# Patient Record
Sex: MALE | Race: BLACK OR AFRICAN AMERICAN | NOT HISPANIC OR LATINO | Employment: OTHER | URBAN - METROPOLITAN AREA
[De-identification: names, ages, dates, MRNs, and addresses within clinical notes are randomized per-mention and may not be internally consistent; named-entity substitution may affect disease eponyms.]

---

## 2024-03-20 ENCOUNTER — HOSPITAL ENCOUNTER (EMERGENCY)
Facility: HOSPITAL | Age: 39
Discharge: HOME | End: 2024-03-20
Payer: COMMERCIAL

## 2024-03-20 VITALS
HEART RATE: 66 BPM | BODY MASS INDEX: 33.59 KG/M2 | WEIGHT: 214 LBS | HEIGHT: 67 IN | OXYGEN SATURATION: 100 % | RESPIRATION RATE: 12 BRPM | TEMPERATURE: 97.7 F | SYSTOLIC BLOOD PRESSURE: 167 MMHG | DIASTOLIC BLOOD PRESSURE: 98 MMHG

## 2024-03-20 DIAGNOSIS — L73.9 FOLLICULITIS: Primary | ICD-10-CM

## 2024-03-20 PROCEDURE — 99283 EMERGENCY DEPT VISIT LOW MDM: CPT

## 2024-03-20 RX ORDER — DOXYCYCLINE 100 MG/1
100 CAPSULE ORAL 2 TIMES DAILY
Qty: 14 CAPSULE | Refills: 0 | Status: SHIPPED | OUTPATIENT
Start: 2024-03-20 | End: 2024-03-27

## 2024-03-20 ASSESSMENT — COLUMBIA-SUICIDE SEVERITY RATING SCALE - C-SSRS
6. HAVE YOU EVER DONE ANYTHING, STARTED TO DO ANYTHING, OR PREPARED TO DO ANYTHING TO END YOUR LIFE?: NO
2. HAVE YOU ACTUALLY HAD ANY THOUGHTS OF KILLING YOURSELF?: NO
1. IN THE PAST MONTH, HAVE YOU WISHED YOU WERE DEAD OR WISHED YOU COULD GO TO SLEEP AND NOT WAKE UP?: NO

## 2024-03-20 ASSESSMENT — LIFESTYLE VARIABLES
EVER HAD A DRINK FIRST THING IN THE MORNING TO STEADY YOUR NERVES TO GET RID OF A HANGOVER: NO
EVER FELT BAD OR GUILTY ABOUT YOUR DRINKING: NO
HAVE PEOPLE ANNOYED YOU BY CRITICIZING YOUR DRINKING: NO
HAVE YOU EVER FELT YOU SHOULD CUT DOWN ON YOUR DRINKING: NO

## 2024-03-20 NOTE — Clinical Note
Vladislav Suresh was seen and treated in our emergency department on 3/20/2024.  He may return to work on 03/21/2024.       If you have any questions or concerns, please don't hesitate to call.      Ellie Amaro PA-C

## 2024-03-20 NOTE — ED TRIAGE NOTES
"PT has a small looking cyst on L-Upper back. PT thinks he was bit by something earlier in the week. Noticed this yesterday when going to scratch his back. Feels \"sore\".   "

## 2024-03-20 NOTE — ED PROVIDER NOTES
HPI   Chief Complaint   Patient presents with    Cyst       Is a 38-year-old otherwise healthy male presenting for evaluation of cyst on his upper left back region.  He states he noticed it about 2 days ago and it was itchy and he scratched it and it became sore yesterday.  He denies any recent fevers, other rashes, immunocompromise state, flulike symptoms, burning or tingling in the area.                        Kumar Coma Scale Score: 15                     Patient History   No past medical history on file.  No past surgical history on file.  No family history on file.  Social History     Tobacco Use    Smoking status: Not on file    Smokeless tobacco: Not on file   Substance Use Topics    Alcohol use: Not on file    Drug use: Not on file       Physical Exam   ED Triage Vitals [03/20/24 1153]   Temperature Heart Rate Respirations BP   36.5 °C (97.7 °F) 66 12 (!) 167/98      Pulse Ox Temp src Heart Rate Source Patient Position   99 % -- -- --      BP Location FiO2 (%)     -- --       Physical Exam  Vitals and nursing note reviewed.   Constitutional:       General: He is not in acute distress.     Appearance: He is well-developed.   HENT:      Head: Normocephalic and atraumatic.   Eyes:      Conjunctiva/sclera: Conjunctivae normal.   Cardiovascular:      Rate and Rhythm: Normal rate and regular rhythm.      Heart sounds: No murmur heard.  Pulmonary:      Effort: Pulmonary effort is normal. No respiratory distress.      Breath sounds: Normal breath sounds.   Abdominal:      Palpations: Abdomen is soft.      Tenderness: There is no abdominal tenderness.   Musculoskeletal:         General: No swelling.      Cervical back: Neck supple.   Skin:     General: Skin is warm and dry.      Capillary Refill: Capillary refill takes less than 2 seconds.      Comments: Small 2 cm right papule to the left upper back with surrounding erythema.   Neurological:      Mental Status: He is alert.   Psychiatric:         Mood and Affect:  Mood normal.         ED Course & MDM   Diagnoses as of 03/21/24 1039   Folliculitis       Medical Decision Making  Parts of this chart have been completed using voice recognition software. Please excuse any errors of transcription.  My thought process and reason for plan has been formulated from the time that I saw the patient until the time of disposition and is not specific to one specific moment during their visit and furthermore my MDM encompasses this entire chart and not only this text box.      HPI: Detailed above.    Exam: A medically appropriate exam performed, outlined above, given the known history and presentation.    History obtained from: Patient    Medications given during visit:  Medications - No data to display     Diagnostic/tests  Labs Reviewed - No data to display   No orders to display        Considerations/further MDM:  Well-appearing 38-year-old male presenting for evaluation of cyst on his left upper back.  On exam, there are white papules with surrounding erythema that appears to be infected ingrown hairs.  I personally lanced the papules with a needle and cleaned the area with chlorhexidine.  Patient was given a 7-day prescription for doxycycline for coverage of infection.  He was instructed to follow-up with primary care regarding his ER visit.  Discharged home in stable condition.      Procedure  Procedures     Ellie Amaro PA-C  03/21/24 1042

## 2024-03-20 NOTE — DISCHARGE INSTRUCTIONS
Please take doxycycline twice a day for 7 days for treatment of your infected ingrown hair.  Keep the area clean and dry.  Follow-up with primary care within 72 hours regarding your ER visit.  Present back to ER if any worsening of symptoms.

## 2024-04-29 ENCOUNTER — HOSPITAL ENCOUNTER (EMERGENCY)
Facility: HOSPITAL | Age: 39
Discharge: HOME | End: 2024-04-29
Payer: COMMERCIAL

## 2024-04-29 VITALS
RESPIRATION RATE: 16 BRPM | DIASTOLIC BLOOD PRESSURE: 74 MMHG | HEART RATE: 81 BPM | WEIGHT: 212 LBS | SYSTOLIC BLOOD PRESSURE: 128 MMHG | HEIGHT: 67 IN | OXYGEN SATURATION: 100 % | BODY MASS INDEX: 33.27 KG/M2 | TEMPERATURE: 98.2 F

## 2024-04-29 DIAGNOSIS — J01.10 ACUTE FRONTAL SINUSITIS, RECURRENCE NOT SPECIFIED: ICD-10-CM

## 2024-04-29 DIAGNOSIS — R09.81 NASAL CONGESTION: Primary | ICD-10-CM

## 2024-04-29 DIAGNOSIS — T78.40XA ALLERGY, INITIAL ENCOUNTER: ICD-10-CM

## 2024-04-29 PROCEDURE — 96372 THER/PROPH/DIAG INJ SC/IM: CPT | Performed by: PHYSICIAN ASSISTANT

## 2024-04-29 PROCEDURE — 99283 EMERGENCY DEPT VISIT LOW MDM: CPT

## 2024-04-29 PROCEDURE — 2500000001 HC RX 250 WO HCPCS SELF ADMINISTERED DRUGS (ALT 637 FOR MEDICARE OP): Performed by: PHYSICIAN ASSISTANT

## 2024-04-29 PROCEDURE — 2500000004 HC RX 250 GENERAL PHARMACY W/ HCPCS (ALT 636 FOR OP/ED): Performed by: PHYSICIAN ASSISTANT

## 2024-04-29 RX ORDER — KETOROLAC TROMETHAMINE 30 MG/ML
30 INJECTION, SOLUTION INTRAMUSCULAR; INTRAVENOUS ONCE
Status: COMPLETED | OUTPATIENT
Start: 2024-04-29 | End: 2024-04-29

## 2024-04-29 RX ORDER — METHYLPREDNISOLONE 4 MG/1
TABLET ORAL
Qty: 21 TABLET | Refills: 0 | Status: SHIPPED | OUTPATIENT
Start: 2024-04-29 | End: 2024-05-06

## 2024-04-29 RX ORDER — PREDNISONE 20 MG/1
60 TABLET ORAL ONCE
Status: COMPLETED | OUTPATIENT
Start: 2024-04-29 | End: 2024-04-29

## 2024-04-29 RX ORDER — CETIRIZINE HYDROCHLORIDE 10 MG/1
10 TABLET ORAL DAILY
Qty: 10 TABLET | Refills: 0 | Status: SHIPPED | OUTPATIENT
Start: 2024-04-29 | End: 2024-05-09

## 2024-04-29 RX ORDER — DOXYCYCLINE 100 MG/1
100 CAPSULE ORAL 2 TIMES DAILY
Qty: 20 CAPSULE | Refills: 0 | Status: SHIPPED | OUTPATIENT
Start: 2024-04-29 | End: 2024-05-09

## 2024-04-29 RX ORDER — DIPHENHYDRAMINE HCL 25 MG
25 TABLET ORAL ONCE
Status: COMPLETED | OUTPATIENT
Start: 2024-04-29 | End: 2024-04-29

## 2024-04-29 RX ADMIN — DIPHENHYDRAMINE HYDROCHLORIDE 25 MG: 25 TABLET ORAL at 13:36

## 2024-04-29 RX ADMIN — PREDNISONE 60 MG: 20 TABLET ORAL at 13:36

## 2024-04-29 RX ADMIN — KETOROLAC TROMETHAMINE 30 MG: 30 INJECTION, SOLUTION INTRAMUSCULAR at 13:36

## 2024-04-29 ASSESSMENT — COLUMBIA-SUICIDE SEVERITY RATING SCALE - C-SSRS
1. IN THE PAST MONTH, HAVE YOU WISHED YOU WERE DEAD OR WISHED YOU COULD GO TO SLEEP AND NOT WAKE UP?: NO
2. HAVE YOU ACTUALLY HAD ANY THOUGHTS OF KILLING YOURSELF?: NO
6. HAVE YOU EVER DONE ANYTHING, STARTED TO DO ANYTHING, OR PREPARED TO DO ANYTHING TO END YOUR LIFE?: NO

## 2024-04-29 ASSESSMENT — PAIN - FUNCTIONAL ASSESSMENT: PAIN_FUNCTIONAL_ASSESSMENT: 0-10

## 2024-04-29 NOTE — ED PROVIDER NOTES
HPI   Chief Complaint   Patient presents with    Nasal Congestion     Severe allergies. Congestion for two days. Headache.       38-year-old male presented emergency department the chief complaint of nasal congestion, allergies.  CAT scan is 2 days ago.  Has had cough congestion since then.  Been taking Claritin with minimal relief.  Denies lightheadedness dizziness numbness weakness.  Well-appearing nontoxic afebrile.  No other complaint.                          Kumar Coma Scale Score: 15                     Patient History   No past medical history on file.  No past surgical history on file.  No family history on file.  Social History     Tobacco Use    Smoking status: Not on file    Smokeless tobacco: Not on file   Substance Use Topics    Alcohol use: Not on file    Drug use: Not on file       Physical Exam   ED Triage Vitals [04/29/24 1315]   Temperature Heart Rate Respirations BP   36.8 °C (98.2 °F) 86 18 (!) 139/97      Pulse Ox Temp Source Heart Rate Source Patient Position   100 % Tympanic -- Sitting      BP Location FiO2 (%)     Left arm --       Physical Exam  Vitals and nursing note reviewed.   Constitutional:       Appearance: Normal appearance.   HENT:      Head: Normocephalic.      Nose: Nose normal.      Mouth/Throat:      Mouth: Mucous membranes are moist.   Cardiovascular:      Rate and Rhythm: Normal rate.   Pulmonary:      Effort: Pulmonary effort is normal.   Musculoskeletal:         General: Normal range of motion.      Cervical back: Normal range of motion.   Skin:     General: Skin is warm.   Neurological:      General: No focal deficit present.      Mental Status: He is alert and oriented to person, place, and time.   Psychiatric:         Mood and Affect: Mood normal.         ED Course & MDM   Diagnoses as of 04/29/24 1335   Nasal congestion   Acute frontal sinusitis, recurrence not specified   Allergy, initial encounter       Medical Decision Making  I have seen and evaluated this  patient.  Physician available for consultation.  Vital signs have been reviewed.  All laboratory and diagnostic imaging is reviewed by myself and interpreted by myself unless otherwise stated.  Additionally imaging is interpreted by radiologist.    Most consistent with nasal congestion likely from allergies, could be viral or bacterial component as well.  Treated with steroid, watch and wait antibiotic.  Released in good condition with outpatient follow-up.    Labs Reviewed - No data to display  No orders to display     Medications   ketorolac (Toradol) injection 30 mg (has no administration in time range)   diphenhydrAMINE (Sominex) tablet 25 mg (has no administration in time range)   predniSONE (Deltasone) tablet 60 mg (has no administration in time range)     New Prescriptions    CETIRIZINE (ZYRTEC) 10 MG TABLET    Take 1 tablet (10 mg) by mouth once daily for 10 days.    DOXYCYCLINE (VIBRAMYCIN) 100 MG CAPSULE    Take 1 capsule (100 mg) by mouth 2 times a day for 10 days. Take with at least 8 ounces (large glass) of water, do not lie down for 30 minutes after    METHYLPREDNISOLONE (MEDROL DOSPAK) 4 MG TABLETS    Follow schedule on package instructions         Procedure  Procedures     Lucius Serna PA-C  04/29/24 3314

## 2024-04-29 NOTE — Clinical Note
Vladislav Suresh was seen and treated in our emergency department on 4/29/2024.  He may return to work on 04/30/2024.       If you have any questions or concerns, please don't hesitate to call.      Lucius Serna PA-C

## 2024-08-04 ENCOUNTER — APPOINTMENT (OUTPATIENT)
Dept: RADIOLOGY | Facility: HOSPITAL | Age: 39
End: 2024-08-04
Payer: COMMERCIAL

## 2024-08-04 ENCOUNTER — HOSPITAL ENCOUNTER (EMERGENCY)
Facility: HOSPITAL | Age: 39
Discharge: HOME | End: 2024-08-04
Attending: EMERGENCY MEDICINE
Payer: COMMERCIAL

## 2024-08-04 VITALS
SYSTOLIC BLOOD PRESSURE: 157 MMHG | RESPIRATION RATE: 18 BRPM | OXYGEN SATURATION: 98 % | WEIGHT: 220 LBS | HEIGHT: 67 IN | TEMPERATURE: 98.2 F | BODY MASS INDEX: 34.53 KG/M2 | HEART RATE: 55 BPM | DIASTOLIC BLOOD PRESSURE: 97 MMHG

## 2024-08-04 DIAGNOSIS — M54.6 ACUTE LEFT-SIDED THORACIC BACK PAIN: Primary | ICD-10-CM

## 2024-08-04 PROCEDURE — 96374 THER/PROPH/DIAG INJ IV PUSH: CPT

## 2024-08-04 PROCEDURE — 2500000004 HC RX 250 GENERAL PHARMACY W/ HCPCS (ALT 636 FOR OP/ED): Performed by: EMERGENCY MEDICINE

## 2024-08-04 PROCEDURE — 71101 X-RAY EXAM UNILAT RIBS/CHEST: CPT | Mod: LEFT SIDE | Performed by: RADIOLOGY

## 2024-08-04 PROCEDURE — 96372 THER/PROPH/DIAG INJ SC/IM: CPT | Performed by: EMERGENCY MEDICINE

## 2024-08-04 PROCEDURE — 71101 X-RAY EXAM UNILAT RIBS/CHEST: CPT | Mod: LT

## 2024-08-04 PROCEDURE — 99284 EMERGENCY DEPT VISIT MOD MDM: CPT | Mod: 25

## 2024-08-04 RX ORDER — CYCLOBENZAPRINE HCL 5 MG
5 TABLET ORAL 3 TIMES DAILY PRN
Qty: 30 TABLET | Refills: 0 | Status: SHIPPED | OUTPATIENT
Start: 2024-08-04 | End: 2024-08-14

## 2024-08-04 RX ORDER — HYDROCODONE BITARTRATE AND ACETAMINOPHEN 5; 325 MG/1; MG/1
1 TABLET ORAL EVERY 6 HOURS PRN
Qty: 12 TABLET | Refills: 0 | Status: SHIPPED | OUTPATIENT
Start: 2024-08-04 | End: 2024-08-07

## 2024-08-04 RX ORDER — MORPHINE SULFATE 4 MG/ML
4 INJECTION, SOLUTION INTRAMUSCULAR; INTRAVENOUS ONCE
Status: COMPLETED | OUTPATIENT
Start: 2024-08-04 | End: 2024-08-04

## 2024-08-04 RX ORDER — HYDROMORPHONE HYDROCHLORIDE 1 MG/ML
1 INJECTION, SOLUTION INTRAMUSCULAR; INTRAVENOUS; SUBCUTANEOUS ONCE
Status: COMPLETED | OUTPATIENT
Start: 2024-08-04 | End: 2024-08-04

## 2024-08-04 RX ORDER — ORPHENADRINE CITRATE 30 MG/ML
60 INJECTION INTRAMUSCULAR; INTRAVENOUS ONCE
Status: COMPLETED | OUTPATIENT
Start: 2024-08-04 | End: 2024-08-04

## 2024-08-04 RX ORDER — KETOROLAC TROMETHAMINE 30 MG/ML
30 INJECTION, SOLUTION INTRAMUSCULAR; INTRAVENOUS ONCE
Status: COMPLETED | OUTPATIENT
Start: 2024-08-04 | End: 2024-08-04

## 2024-08-04 ASSESSMENT — PAIN DESCRIPTION - LOCATION
LOCATION: BACK
LOCATION: BACK

## 2024-08-04 ASSESSMENT — PAIN DESCRIPTION - FREQUENCY: FREQUENCY: CONSTANT/CONTINUOUS

## 2024-08-04 ASSESSMENT — PAIN SCALES - GENERAL
PAINLEVEL_OUTOF10: 10 - WORST POSSIBLE PAIN
PAINLEVEL_OUTOF10: 5 - MODERATE PAIN
PAINLEVEL_OUTOF10: 10 - WORST POSSIBLE PAIN
PAINLEVEL_OUTOF10: 7

## 2024-08-04 ASSESSMENT — PAIN DESCRIPTION - ORIENTATION
ORIENTATION: LEFT
ORIENTATION: LEFT

## 2024-08-04 ASSESSMENT — PAIN DESCRIPTION - DESCRIPTORS: DESCRIPTORS: SHARP

## 2024-08-04 ASSESSMENT — PAIN - FUNCTIONAL ASSESSMENT: PAIN_FUNCTIONAL_ASSESSMENT: 0-10

## 2024-08-04 NOTE — ED TRIAGE NOTES
While at home smoking patient started to cough, when he noticed he felt a pop on left side, says that the pain is 10 out 10 and the pain is only on the left lower part of his back, denies any falling or past injury.

## 2024-08-04 NOTE — ED PROVIDER NOTES
EMERGENCY DEPARTMENT ENCOUNTER      Pt Name: Vladislav Suresh  MRN: [id number]  Birthdate 1985  Date of evaluation: 8/4/2024  ED Provider: Anette Gaston DO     CHIEF COMPLAINT       Chief Complaint   Patient presents with    Back Pain     Lower left part of back        HISTORY OF PRESENT ILLNESS    Vladislav Suresh is a 38 y.o. who presents to the emergency department via private vehicle with complaint of back pain after coughing.  He is being seen for smoking marijuana yesterday and went into a coughing fit.  He felt something pop in his left back and has had severe pain since.  He took one 800 mg ibuprofen with minimal improvement.  He presents now for further evaluation.  The pain does not radiate down his legs.  He does have baseline neuropathy with foot drop to the right leg that is unchanged.  He states the pain is worse with deep breathing and movement.  No other acute complaints.    REVIEW OF SYSTEMS     Focused ROS performed and negative other than as listed in HPI    PAST MEDICAL HISTORY   No past medical history on file.    SURGICAL HISTORY     No past surgical history on file.    CURRENT MEDICATIONS       Discharge Medication List as of 8/4/2024 10:58 AM        CONTINUE these medications which have NOT CHANGED    Details   cetirizine (ZyrTEC) 10 mg tablet Take 1 tablet (10 mg) by mouth once daily for 10 days., Starting Mon 4/29/2024, Until Thu 5/9/2024, Normal             ALLERGIES     Shellfish containing products, Naproxen, and Penicillins    FAMILY HISTORY     No family history on file.     SOCIAL HISTORY       Social History     Socioeconomic History    Marital status: Single       PHYSICAL EXAM       ED Triage Vitals [08/04/24 0625]   Temperature Heart Rate Respirations BP   36.8 °C (98.2 °F) 67 16 (!) 162/104      Pulse Ox Temp src Heart Rate Source Patient Position   99 % -- -- --      BP Location FiO2 (%)     -- --        General: Appears well, no acute distress, alert  Head: Head  atraumatic; normocephalic  Eyes: normal inspection; no icterus  ENT: mucosa moist without lesion  Neck: Normal inspection, no meningeal signs  Resp: Normal breath sounds, no wheeze or crackles; No respiratory distress  Chest Wall: no tenderness or deformity  Heart: Heart rate and rhythm regular; No Murmurs  Abdomen: Soft, Non-tender; No distention, guarding, rigidity, or rebound  MSK: Normal appearance; Moves all extremities; No Pedal edema; no midline spinal tenderness, positive tenderness to the left back extending from the lower posterior rib cage to the mid quadratus lumborum, sensation intact dermatomes L2 through S1 bilaterally, strength intact to hip flexors/extensors, knee flexors/extensors, plantar/dorsiflexion bilaterally.   Neuro: Alert; no focal deficits, moves all extremities  Psych: Mood and Affect normal  Skin: Color appropriate; warm; Dry    DIAGNOSTIC RESULTS   Lab and radiology results are independently interpreted unless noted below.  RADIOLOGY (Per Emergency Physician):     Interpretation per the Radiologist below, if available at the time of this note:  XR ribs 2 views left w chest pa or ap   Final Result   Negative chest and left ribs.        MACRO:   None        Signed by: Meg Silva 8/4/2024 8:32 AM   Dictation workstation:   [id number]        EMERGENCY DEPARTMENT COURSE/MDM   Patient presents with musculoskeletal back pain after a coughing fit yesterday evening.  He is exquisitely tender to palpation however there is no radiculopathy.  No red flag symptoms or concern for acute neurologic findings on high-sensitivity neuroexam.  Will obtain x-ray to ensure that there is no acute fracture to the rib cage from coughing.  He is provided a muscle relaxer, Toradol (allergy to Naprosyn noted however he has tolerated ibuprofen), and morphine.  He is agreeable with this plan of care.    ED Course as of 08/04/24 1131   Sun Aug 04, 2024   0955 X-ray returned showing no acute abnormalities.  On  reexamination patient states that his pain is no better.  Will provide another dose of pain medicine. [EF]      ED Course User Index  [EF] Anette Gaston, DO         Diagnoses as of 08/04/24 1131   Acute left-sided thoracic back pain       Meds Administered:  Medications   ketorolac (Toradol) injection 30 mg (30 mg intramuscular Given 8/4/24 0735)   morphine injection 4 mg (4 mg intramuscular Given 8/4/24 0735)   orphenadrine (Norflex) injection 60 mg (60 mg intramuscular Given 8/4/24 0735)   HYDROmorphone (Dilaudid) injection 1 mg (1 mg intravenous Given 8/4/24 1015)       PROCEDURES   Unless otherwise noted below, none  Procedures      FINAL IMPRESSION      1. Acute left-sided thoracic back pain          DISPOSITION    Discharge 08/04/2024 10:55:45 AM    DISCHARGE   PATIENT REFERRED TO:  No follow-up provider specified.    DISCHARGE MEDICATIONS:  Discharge Medication List as of 8/4/2024 10:58 AM        START taking these medications    Details   cyclobenzaprine (Flexeril) 5 mg tablet Take 1 tablet (5 mg) by mouth 3 times a day as needed for muscle spasms for up to 10 days., Starting Sun 8/4/2024, Until Wed 8/14/2024 at 2359, Normal      HYDROcodone-acetaminophen (Norco) 5-325 mg tablet Take 1 tablet by mouth every 6 hours if needed for severe pain (7 - 10) for up to 3 days., Starting Sun 8/4/2024, Until Wed 8/7/2024 at 2359, Normal              The patient is discharged back to their place of residence.  Discharge diagnosis, instructions and plan were discussed and understood. At the time of discharge the patient was comfortable and was in no apparent distress. Patient is aware of diagnostic uncertainty and was notified though testing is negative here, there is a very small chance that pathology may be missed.  The patient understands these risks and the patient /family understood to return immediately to the emergency department if the symptoms worsen or if they have any additional concerns.      (Comment: Please  note this report has been produced using speech recognition software and may contain errors related to that system including errors in grammar, punctuation, and spelling, as well as words and phrases that may be inappropriate.  If there are any questions or concerns please feel free to contact the dictating provider for clarification.)    Anette Gaston DO (electronically signed)  Emergency Medicine Physician    History Limited by: None  Independent history obtained from: None  External records reviewed: None  Diagnostics interpreted by me: None  Discussions with other clinicians: None  Chronic conditions impacting care: None  Social determinants of health affecting care: None  Diagnostic tests considered but not performed: n/a  ED Medications managed:  Medications   ketorolac (Toradol) injection 30 mg (30 mg intramuscular Given 8/4/24 0735)   morphine injection 4 mg (4 mg intramuscular Given 8/4/24 0735)   orphenadrine (Norflex) injection 60 mg (60 mg intramuscular Given 8/4/24 0735)   HYDROmorphone (Dilaudid) injection 1 mg (1 mg intravenous Given 8/4/24 1015)       Prescription drugs considered: None             Anette Gaston DO  08/04/24 1137

## 2024-09-21 ENCOUNTER — APPOINTMENT (OUTPATIENT)
Dept: RADIOLOGY | Facility: HOSPITAL | Age: 39
End: 2024-09-21
Payer: COMMERCIAL

## 2024-09-21 ENCOUNTER — HOSPITAL ENCOUNTER (EMERGENCY)
Facility: HOSPITAL | Age: 39
Discharge: LEFT BEFORE DISCHARGE | End: 2024-09-21
Payer: COMMERCIAL

## 2024-09-21 ENCOUNTER — APPOINTMENT (OUTPATIENT)
Dept: CARDIOLOGY | Facility: HOSPITAL | Age: 39
End: 2024-09-21
Payer: COMMERCIAL

## 2024-09-21 VITALS
WEIGHT: 220 LBS | RESPIRATION RATE: 17 BRPM | TEMPERATURE: 98.1 F | HEIGHT: 67 IN | OXYGEN SATURATION: 100 % | DIASTOLIC BLOOD PRESSURE: 101 MMHG | BODY MASS INDEX: 34.53 KG/M2 | HEART RATE: 83 BPM | SYSTOLIC BLOOD PRESSURE: 155 MMHG

## 2024-09-21 DIAGNOSIS — R00.2 PALPITATIONS: Primary | ICD-10-CM

## 2024-09-21 LAB
ALBUMIN SERPL BCP-MCNC: 4.2 G/DL (ref 3.4–5)
ALP SERPL-CCNC: 75 U/L (ref 33–120)
ALT SERPL W P-5'-P-CCNC: 39 U/L (ref 10–52)
ANION GAP SERPL CALCULATED.3IONS-SCNC: 9 MMOL/L (ref 10–20)
AST SERPL W P-5'-P-CCNC: 21 U/L (ref 9–39)
BASOPHILS # BLD AUTO: 0.09 X10*3/UL (ref 0–0.1)
BASOPHILS NFR BLD AUTO: 0.8 %
BILIRUB SERPL-MCNC: 0.4 MG/DL (ref 0–1.2)
BUN SERPL-MCNC: 11 MG/DL (ref 6–23)
CALCIUM SERPL-MCNC: 9.3 MG/DL (ref 8.6–10.3)
CARDIAC TROPONIN I PNL SERPL HS: 8 NG/L (ref 0–20)
CARDIAC TROPONIN I PNL SERPL HS: 8 NG/L (ref 0–20)
CHLORIDE SERPL-SCNC: 105 MMOL/L (ref 98–107)
CO2 SERPL-SCNC: 28 MMOL/L (ref 21–32)
CREAT SERPL-MCNC: 0.96 MG/DL (ref 0.5–1.3)
EGFRCR SERPLBLD CKD-EPI 2021: >90 ML/MIN/1.73M*2
EOSINOPHIL # BLD AUTO: 0.38 X10*3/UL (ref 0–0.7)
EOSINOPHIL NFR BLD AUTO: 3.3 %
ERYTHROCYTE [DISTWIDTH] IN BLOOD BY AUTOMATED COUNT: 11.6 % (ref 11.5–14.5)
GLUCOSE SERPL-MCNC: 86 MG/DL (ref 74–99)
HCT VFR BLD AUTO: 43.2 % (ref 41–52)
HGB BLD-MCNC: 14.6 G/DL (ref 13.5–17.5)
IMM GRANULOCYTES # BLD AUTO: 0.06 X10*3/UL (ref 0–0.7)
IMM GRANULOCYTES NFR BLD AUTO: 0.5 % (ref 0–0.9)
LYMPHOCYTES # BLD AUTO: 3.15 X10*3/UL (ref 1.2–4.8)
LYMPHOCYTES NFR BLD AUTO: 27.4 %
MCH RBC QN AUTO: 32.7 PG (ref 26–34)
MCHC RBC AUTO-ENTMCNC: 33.8 G/DL (ref 32–36)
MCV RBC AUTO: 97 FL (ref 80–100)
MONOCYTES # BLD AUTO: 0.81 X10*3/UL (ref 0.1–1)
MONOCYTES NFR BLD AUTO: 7 %
NEUTROPHILS # BLD AUTO: 7 X10*3/UL (ref 1.2–7.7)
NEUTROPHILS NFR BLD AUTO: 61 %
NRBC BLD-RTO: 0 /100 WBCS (ref 0–0)
PLATELET # BLD AUTO: 369 X10*3/UL (ref 150–450)
POTASSIUM SERPL-SCNC: 4.1 MMOL/L (ref 3.5–5.3)
PROT SERPL-MCNC: 7.2 G/DL (ref 6.4–8.2)
RBC # BLD AUTO: 4.46 X10*6/UL (ref 4.5–5.9)
SODIUM SERPL-SCNC: 138 MMOL/L (ref 136–145)
WBC # BLD AUTO: 11.5 X10*3/UL (ref 4.4–11.3)

## 2024-09-21 PROCEDURE — 85025 COMPLETE CBC W/AUTO DIFF WBC: CPT | Performed by: NURSE PRACTITIONER

## 2024-09-21 PROCEDURE — 84484 ASSAY OF TROPONIN QUANT: CPT | Performed by: NURSE PRACTITIONER

## 2024-09-21 PROCEDURE — 71045 X-RAY EXAM CHEST 1 VIEW: CPT

## 2024-09-21 PROCEDURE — 80053 COMPREHEN METABOLIC PANEL: CPT | Performed by: NURSE PRACTITIONER

## 2024-09-21 PROCEDURE — 71045 X-RAY EXAM CHEST 1 VIEW: CPT | Performed by: RADIOLOGY

## 2024-09-21 PROCEDURE — 36415 COLL VENOUS BLD VENIPUNCTURE: CPT | Performed by: NURSE PRACTITIONER

## 2024-09-21 PROCEDURE — 2500000004 HC RX 250 GENERAL PHARMACY W/ HCPCS (ALT 636 FOR OP/ED): Performed by: NURSE PRACTITIONER

## 2024-09-21 PROCEDURE — 73080 X-RAY EXAM OF ELBOW: CPT | Mod: RT

## 2024-09-21 PROCEDURE — 93005 ELECTROCARDIOGRAM TRACING: CPT

## 2024-09-21 PROCEDURE — 99284 EMERGENCY DEPT VISIT MOD MDM: CPT | Mod: 25

## 2024-09-21 PROCEDURE — 96374 THER/PROPH/DIAG INJ IV PUSH: CPT

## 2024-09-21 PROCEDURE — 73080 X-RAY EXAM OF ELBOW: CPT | Mod: RIGHT SIDE | Performed by: RADIOLOGY

## 2024-09-21 RX ORDER — KETOROLAC TROMETHAMINE 30 MG/ML
15 INJECTION, SOLUTION INTRAMUSCULAR; INTRAVENOUS ONCE
Status: COMPLETED | OUTPATIENT
Start: 2024-09-21 | End: 2024-09-21

## 2024-09-21 RX ADMIN — KETOROLAC TROMETHAMINE 15 MG: 30 INJECTION, SOLUTION INTRAMUSCULAR at 20:27

## 2024-09-21 ASSESSMENT — LIFESTYLE VARIABLES
EVER HAD A DRINK FIRST THING IN THE MORNING TO STEADY YOUR NERVES TO GET RID OF A HANGOVER: NO
EVER FELT BAD OR GUILTY ABOUT YOUR DRINKING: NO
HAVE PEOPLE ANNOYED YOU BY CRITICIZING YOUR DRINKING: NO
HAVE YOU EVER FELT YOU SHOULD CUT DOWN ON YOUR DRINKING: NO
TOTAL SCORE: 0

## 2024-09-21 ASSESSMENT — PAIN - FUNCTIONAL ASSESSMENT
PAIN_FUNCTIONAL_ASSESSMENT: 0-10
PAIN_FUNCTIONAL_ASSESSMENT: 0-10

## 2024-09-21 ASSESSMENT — PAIN DESCRIPTION - PAIN TYPE: TYPE: ACUTE PAIN

## 2024-09-21 ASSESSMENT — PAIN SCALES - GENERAL
PAINLEVEL_OUTOF10: 4
PAINLEVEL_OUTOF10: 10 - WORST POSSIBLE PAIN

## 2024-09-21 ASSESSMENT — PAIN DESCRIPTION - PROGRESSION: CLINICAL_PROGRESSION: GRADUALLY IMPROVING

## 2024-09-21 NOTE — ED TRIAGE NOTES
"\"2 days ago feels like heart is racing. Feels like it is racing right now.\" Per pt.  Current heart rate 80s. Also c/o Rt elbow pain.   "

## 2024-09-21 NOTE — ED PROVIDER NOTES
HPI   Chief Complaint   Patient presents with    Rapid Heart Rate    Elbow Pain       HPI  See my MDM      Patient History   No past medical history on file.  No past surgical history on file.  No family history on file.  Social History     Tobacco Use    Smoking status: Not on file    Smokeless tobacco: Not on file   Substance Use Topics    Alcohol use: Not on file    Drug use: Not on file       Physical Exam   ED Triage Vitals [09/21/24 1805]   Temperature Heart Rate Respirations BP   36.7 °C (98.1 °F) 83 17 (!) 155/101      Pulse Ox Temp Source Heart Rate Source Patient Position   100 % Temporal Radial Sitting      BP Location FiO2 (%)     Left arm --       Physical Exam  CONSTITUTIONAL: Vital signs reviewed as charted, well-developed and in no distress  Eyes: Extraocular muscles are intact. Pupils equal round and reactive to light. Conjunctiva are pink.    ENT: Mucous membranes are moist. Tongue in the midline. Pharynx was without erythema or exudates, uvula midline  LUNGS: Breath sounds equal and clear to auscultation. Good air exchange, no wheezes rales or retractions, pulse oximetry is charted.  HEART: Regular rate and rhythm without murmur thrill or rub, strong tones, auscultation is normal.  ABDOMEN: Soft and nontender without guarding rebound rigidity or mass. Bowel sounds are present and normal in all quadrants. There is no palpable masses or aneurysms identified. No hepatosplenomegaly, normal abdominal exam.  Neuro: The patient is awake, alert and oriented ×3. Moving all 4 extremities and answering questions appropriately.   MUSCULOSKELETAL: Exam of the right elbow shows tenderness over the ulnar side.  There is no ecchymosis edema or deformity motor sensation pulses are intact.  PSYCH: Awake alert oriented, normal mood and affect.  Skin:  Dry, normal color, warm to the touch, no rash present.        ED Course & MDM   ED Course as of 09/21/24 4593   Sat Sep 21, 2024   2020 ECG 12 Lead  ECG performed on  2024 at  and interpreted by me at  showing a normal sinus rhythm with sinus arrhythmia, no axis deviation, intervals within normal limits, no STEMI.  No previous for comparison. [EG]      ED Course User Index  [EG] Ashtyn Hemphill MD         Diagnoses as of 249   Palpitations                 No data recorded     [place] Coma Scale Score: 15 (24 2030 : Snow Talavera RN)                           Medical Decision Making  History obtained from: patient    Vital signs, nursing notes, current medications, past medical history, Surgical history, allergies, social history, family History were reviewed.         HPI:  Patient 38-year-old gentleman who is an active smoker presenting to ED today stating the last 2 days since his mom  he has been having a feeling of palpitations and chest discomfort.  No fevers chills or night sweats.  No cough or congestion.  No nausea vomiting diarrhea.  Denies dizziness, abdominal pain, or lower extremity edema.  Denies recent travel or surgeries.  He is not tachycardic.  Vital signs are positive hypertension when she states he has a history of issues been off his meds.  States he does not use excessive caffeine or alcohol.  Patient was complains of right elbow pain.  States 2 days ago feels like he hyperextended the elbow has been having pain since.      10 point ROS was reviewed and negative except Noted above in HPI.  DDX: as listed above          MDM Summary/considerations:  EMERGENCY DEPARTMENT COURSE and DIFFERENTIAL DIAGNOSIS/MDM:    The patient presented with a chief complaint of palpitations, chest pain. The differential diagnosis associated with this patient's presentation includes CAD, anxiety.     Vitals:    Vitals:    24 1805   BP: (!) 155/101   BP Location: Left arm   Patient Position: Sitting   Pulse: 83   Resp: 17   Temp: 36.7 °C (98.1 °F)   TempSrc: Temporal   SpO2: 100%   Weight: 99.8 kg (220 lb)   Height: 1.702 m  "(5' 7\")       ED Course as of 09/21/24 2159   Sat Sep 21, 2024   2020 ECG 12 Lead  ECG performed on September 21, 2024 at 2006 and interpreted by me at 2008 showing a normal sinus rhythm with sinus arrhythmia, no axis deviation, intervals within normal limits, no STEMI.  No previous for comparison. [EG]      ED Course User Index  [EG] Ashtyn Hemphill MD         Diagnoses as of 09/21/24 2159   Palpitations       History Limited by:    None    Independent history obtained from:    None    External records reviewed:    None    Diagnostics interpreted by me:    EKG interpreted by my attending physician and Xray(s) of the chest    Discussions with other clinicians:    None    Chronic conditions impacting care:    None    Social determinants of health affecting care:    None    Diagnostic tests considered but not performed: none    ED Medications managed:    Medications   ketorolac (Toradol) injection 15 mg (15 mg intravenous Given 9/21/24 2027)       Prescription drugs considered:    None    Screenings:          Labs Reviewed   COMPREHENSIVE METABOLIC PANEL - Abnormal       Result Value    Glucose 86      Sodium 138      Potassium 4.1      Chloride 105      Bicarbonate 28      Anion Gap 9 (*)     Urea Nitrogen 11      Creatinine 0.96      eGFR >90      Calcium 9.3      Albumin 4.2      Alkaline Phosphatase 75      Total Protein 7.2      AST 21      Bilirubin, Total 0.4      ALT 39     CBC WITH AUTO DIFFERENTIAL - Abnormal    WBC 11.5 (*)     nRBC 0.0      RBC 4.46 (*)     Hemoglobin 14.6      Hematocrit 43.2      MCV 97      MCH 32.7      MCHC 33.8      RDW 11.6      Platelets 369      Neutrophils % 61.0      Immature Granulocytes %, Automated 0.5      Lymphocytes % 27.4      Monocytes % 7.0      Eosinophils % 3.3      Basophils % 0.8      Neutrophils Absolute 7.00      Immature Granulocytes Absolute, Automated 0.06      Lymphocytes Absolute 3.15      Monocytes Absolute 0.81      Eosinophils Absolute 0.38      " Basophils Absolute 0.09     SERIAL TROPONIN-INITIAL - Normal    Troponin I, High Sensitivity 8      Narrative:     Less than 99th percentile of normal range cutoff-  Female and children under 18 years old <14 ng/L; Male <21 ng/L: Negative  Repeat testing should be performed if clinically indicated.     Female and children under 18 years old 14-50 ng/L; Male 21-50 ng/L:  Consistent with possible cardiac damage and possible increased clinical   risk. Serial measurements may help to assess extent of myocardial damage.     >50 ng/L: Consistent with cardiac damage, increased clinical risk and  myocardial infarction. Serial measurements may help assess extent of   myocardial damage.      NOTE: Children less than 1 year old may have higher baseline troponin   levels and results should be interpreted in conjunction with the overall   clinical context.     NOTE: Troponin I testing is performed using a different   testing methodology at [facility] than at other   [facility]. Direct result comparisons should only   be made within the same method.   SERIAL TROPONIN, 1 HOUR - Normal    Troponin I, High Sensitivity 8      Narrative:     Less than 99th percentile of normal range cutoff-  Female and children under 18 years old <14 ng/L; Male <21 ng/L: Negative  Repeat testing should be performed if clinically indicated.     Female and children under 18 years old 14-50 ng/L; Male 21-50 ng/L:  Consistent with possible cardiac damage and possible increased clinical   risk. Serial measurements may help to assess extent of myocardial damage.     >50 ng/L: Consistent with cardiac damage, increased clinical risk and  myocardial infarction. Serial measurements may help assess extent of   myocardial damage.      NOTE: Children less than 1 year old may have higher baseline troponin   levels and results should be interpreted in conjunction with the overall   clinical context.     NOTE: Troponin I testing is performed using  a different   testing methodology at [facility] than at other   [facility]. Direct result comparisons should only   be made within the same method.   TROPONIN SERIES- (INITIAL, 1 HR)    Narrative:     The following orders were created for panel order Troponin I Series, High Sensitivity (0, 1 HR).  Procedure                               Abnormality         Status                     ---------                               -----------         ------                     Troponin I, High Sensiti...[[id number]]  Normal              Final result               Troponin, High Sensitivi...[[id number]]  Normal              Final result                 Please view results for these tests on the individual orders.     XR chest 1 view   Final Result   No acute cardiopulmonary process is evident.        MACRO:   None        Signed by: Huan Killian 9/21/2024 6:56 PM   Dictation workstation:   [id number]      XR elbow right 3+ views   Final Result   No osseous injury is evident.        MACRO:   None        Signed by: Huan Killian 9/21/2024 6:56 PM   Dictation workstation:   [id number]        Medications   ketorolac (Toradol) injection 15 mg (15 mg intravenous Given 9/21/24 2027)     New Prescriptions    No medications on file     Patient did not wish to wait for his metabolic panel at this time did leave prior to completion of treatment.  Did recommend establishing with PCP for further evaluation and treatment.  At time he left he had 2 normal troponins nonischemic EKG chest x-ray was unremarkable x-ray of the elbow was also unremarkable.              Critical Care: Not warranted at this time        This chart was completed using voice recognition transcription software. Please excuse any errors of transcription including grammatical, punctuation, syntax and spelling errors.  Please contact me with any questions regarding this chart.    Procedure  Procedures     MAKSIM Kimble-OZZIE  09/21/24  [id number]

## 2024-10-01 PROBLEM — R00.2 PALPITATIONS: Status: ACTIVE | Noted: 2024-10-01

## 2024-10-01 PROBLEM — F43.21 GRIEF REACTION: Status: ACTIVE | Noted: 2024-10-01

## 2024-10-01 PROBLEM — F43.20 GRIEF REACTION: Status: ACTIVE | Noted: 2024-10-01

## 2024-10-01 NOTE — PROGRESS NOTES
Subjective   Patient ID:   Vladislav Suresh is a 38 y.o. male who presents for New Patient. ER follow up (Heart palpitations).  HPI  New patient here today to establish care with myself.  Last PCP: N/A  Last seen:    Right elbow pain:  No acute injury.  Symptoms x over a month.  X-ray in the ED in Sep 2024 was normal.  Still has good ROM.  Had previously been on Ibuprofen 800mg with success.    Allergic rhinitis:  Taking Zyrtec.  This is stable.    Shellfish allergy:  Has an Epipen.    GERD:  Taking Prilosec.  This is stable.    Right leg pain:  Has had multiple surgeries in his knee and leg.  Hardware was put in.  Usually takes Ibuprofen 800mg.  Would like to see pain management.    Insomnia:  Has been on medications in the past without success.  Needs a sleep study.  Snoring.  Waking up gasping for air.    ED follow up:  Was in the ED on 9/21/24.  ED note reviewed.  Was having palpitations.  His mother had passed away prior to this.  EKG showed NSR with sinus arrhythmia  Labs and CXR were normal.  Symptoms have improved/resolved.    Tinea pedis:  Both feet.  Would like to see podiatry.  Has tried OTC treatments without success.    Health maintenance:  Smoking: Current smoker.  Labs: Sep 2024. DUE for lipid.  Influenza: Declines    Review of Systems  12 point review of systems negative unless stated above in HPI    Vitals:    10/08/24 0932   BP: 136/84   Pulse: 65   Resp: 16   SpO2: 98%     Physical Exam  General: Alert and oriented, well nourished, no acute distress.  Lungs: Clear to auscultation, non-labored respiration.  Heart: Normal rate, regular rhythm, no murmur, gallop or edema.  Neurologic: Awake, alert, and oriented X3, CN II-XII intact.  Psychiatric: Cooperative, appropriate mood and affect.  Musc: Normal ROM in right elbow and knee.    Assessment/Plan   It was nice meeting you!  I have ordered some labs to be done as soon as you can.  We will call you with the results.  I have placed a referral to pain  management for further management.  I have placed a referral to podiatry for further management.  Medications were refilled.  I have sent in Prednisone for the elbow.  If this does not help, may need PT/ortho.  I have ordered a sleep study to be done.  We will call the results.  If symptoms persist or worsen despite current plan of care, please contact your healthcare provider for further evaluation.  Patient instructed to contact the office if there are any questions regarding their care or treatment.   [facility] [phone]    Fu 6 months or sooner  Diagnoses and all orders for this visit:  Palpitations  -     Lipid Panel; Future  Grief reaction  Right leg pain  -     ibuprofen 800 mg tablet; Take 1 tablet (800 mg) by mouth 3 times a day as needed for mild pain (1 - 3) or moderate pain (4 - 6) (pain).  -     Referral to Pain Medicine; Future  Gastroesophageal reflux disease without esophagitis  -     omeprazole (PriLOSEC) 40 mg DR capsule; Take 1 capsule (40 mg) by mouth once daily.  Shellfish allergy  -     EPINEPHrine 0.3 mg/0.3 mL injection syringe; Inject 0.3 mL (0.3 mg) into the muscle 1 time if needed for anaphylaxis (shellfish allergy).  Seasonal allergic rhinitis, unspecified trigger  -     cetirizine (ZyrTEC) 10 mg tablet; Take 1 tablet (10 mg) by mouth once daily.  Witnessed apneic spells  -     Home sleep apnea test (HSAT); Future  Snoring  -     Home sleep apnea test (HSAT); Future  Primary insomnia  -     Home sleep apnea test (HSAT); Future  Nasal congestion  -     cetirizine (ZyrTEC) 10 mg tablet; Take 1 tablet (10 mg) by mouth once daily.  Right elbow pain  -     predniSONE (Deltasone) 20 mg tablet; TAKE 2 TABLETS BY MOUTH DAYS 1-4. THEN TAKE 1 TABLET BY MOUTH DAYS 5-7.  Tinea pedis of both feet  -     Referral to Podiatry; Future

## 2024-10-08 ENCOUNTER — OFFICE VISIT (OUTPATIENT)
Dept: PRIMARY CARE | Facility: CLINIC | Age: 39
End: 2024-10-08
Payer: COMMERCIAL

## 2024-10-08 VITALS
DIASTOLIC BLOOD PRESSURE: 84 MMHG | OXYGEN SATURATION: 98 % | HEART RATE: 65 BPM | HEIGHT: 67 IN | RESPIRATION RATE: 16 BRPM | WEIGHT: 218 LBS | SYSTOLIC BLOOD PRESSURE: 136 MMHG | BODY MASS INDEX: 34.21 KG/M2

## 2024-10-08 DIAGNOSIS — R09.81 NASAL CONGESTION: ICD-10-CM

## 2024-10-08 DIAGNOSIS — F43.21 GRIEF REACTION: ICD-10-CM

## 2024-10-08 DIAGNOSIS — M25.521 RIGHT ELBOW PAIN: ICD-10-CM

## 2024-10-08 DIAGNOSIS — R06.83 SNORING: ICD-10-CM

## 2024-10-08 DIAGNOSIS — B35.3 TINEA PEDIS OF BOTH FEET: ICD-10-CM

## 2024-10-08 DIAGNOSIS — R06.83 SNORING: Primary | ICD-10-CM

## 2024-10-08 DIAGNOSIS — Z91.013 SHELLFISH ALLERGY: ICD-10-CM

## 2024-10-08 DIAGNOSIS — K21.9 GASTROESOPHAGEAL REFLUX DISEASE WITHOUT ESOPHAGITIS: ICD-10-CM

## 2024-10-08 DIAGNOSIS — F51.01 PRIMARY INSOMNIA: ICD-10-CM

## 2024-10-08 DIAGNOSIS — M79.604 RIGHT LEG PAIN: ICD-10-CM

## 2024-10-08 DIAGNOSIS — R00.2 PALPITATIONS: Primary | ICD-10-CM

## 2024-10-08 DIAGNOSIS — R06.81 WITNESSED APNEIC SPELLS: ICD-10-CM

## 2024-10-08 DIAGNOSIS — J30.2 SEASONAL ALLERGIC RHINITIS, UNSPECIFIED TRIGGER: ICD-10-CM

## 2024-10-08 PROCEDURE — 99205 OFFICE O/P NEW HI 60 MIN: CPT | Performed by: PHYSICIAN ASSISTANT

## 2024-10-08 PROCEDURE — 99215 OFFICE O/P EST HI 40 MIN: CPT | Performed by: PHYSICIAN ASSISTANT

## 2024-10-08 PROCEDURE — 4004F PT TOBACCO SCREEN RCVD TLK: CPT | Performed by: PHYSICIAN ASSISTANT

## 2024-10-08 PROCEDURE — 3008F BODY MASS INDEX DOCD: CPT | Performed by: PHYSICIAN ASSISTANT

## 2024-10-08 RX ORDER — PREDNISONE 20 MG/1
TABLET ORAL
Qty: 11 TABLET | Refills: 0 | Status: SHIPPED | OUTPATIENT
Start: 2024-10-08

## 2024-10-08 RX ORDER — CETIRIZINE HYDROCHLORIDE 10 MG/1
10 TABLET ORAL DAILY
Qty: 90 TABLET | Refills: 1 | Status: SHIPPED | OUTPATIENT
Start: 2024-10-08 | End: 2025-04-06

## 2024-10-08 RX ORDER — OMEPRAZOLE 40 MG/1
40 CAPSULE, DELAYED RELEASE ORAL
COMMUNITY
Start: 2023-12-22 | End: 2024-10-08 | Stop reason: SDUPTHER

## 2024-10-08 RX ORDER — EPINEPHRINE 0.3 MG/.3ML
1 INJECTION SUBCUTANEOUS ONCE AS NEEDED
COMMUNITY
Start: 2023-12-22 | End: 2024-10-08 | Stop reason: SDUPTHER

## 2024-10-08 RX ORDER — EPINEPHRINE 0.3 MG/.3ML
1 INJECTION SUBCUTANEOUS ONCE AS NEEDED
Qty: 1 EACH | Refills: 2 | Status: SHIPPED | OUTPATIENT
Start: 2024-10-08

## 2024-10-08 RX ORDER — IBUPROFEN 800 MG/1
800 TABLET ORAL 3 TIMES DAILY PRN
Qty: 270 TABLET | Refills: 1 | Status: SHIPPED | OUTPATIENT
Start: 2024-10-08 | End: 2025-04-06

## 2024-10-08 RX ORDER — OMEPRAZOLE 40 MG/1
40 CAPSULE, DELAYED RELEASE ORAL
Qty: 90 CAPSULE | Refills: 1 | Status: SHIPPED | OUTPATIENT
Start: 2024-10-08 | End: 2025-04-06

## 2024-10-08 ASSESSMENT — ENCOUNTER SYMPTOMS
OCCASIONAL FEELINGS OF UNSTEADINESS: 0
LOSS OF SENSATION IN FEET: 0
DEPRESSION: 0

## 2024-10-08 ASSESSMENT — PATIENT HEALTH QUESTIONNAIRE - PHQ9
2. FEELING DOWN, DEPRESSED OR HOPELESS: NOT AT ALL
SUM OF ALL RESPONSES TO PHQ9 QUESTIONS 1 AND 2: 0
1. LITTLE INTEREST OR PLEASURE IN DOING THINGS: NOT AT ALL

## 2024-10-08 ASSESSMENT — PAIN SCALES - GENERAL: PAINLEVEL: 10-WORST PAIN EVER

## 2024-10-14 ENCOUNTER — APPOINTMENT (OUTPATIENT)
Dept: SLEEP MEDICINE | Facility: CLINIC | Age: 39
End: 2024-10-14
Payer: COMMERCIAL

## 2024-10-31 ENCOUNTER — APPOINTMENT (OUTPATIENT)
Dept: PAIN MEDICINE | Facility: CLINIC | Age: 39
End: 2024-10-31
Payer: COMMERCIAL

## 2024-11-01 PROBLEM — E11.10 DKA (DIABETIC KETOACIDOSIS) (MULTI): Status: ACTIVE | Noted: 2022-04-22

## 2024-11-01 PROBLEM — S86.111A RUPTURE OF RIGHT GASTROCNEMIUS TENDON: Status: ACTIVE | Noted: 2018-06-18

## 2024-11-01 PROBLEM — R53.82 CHRONIC FATIGUE: Status: ACTIVE | Noted: 2019-06-13

## 2024-11-01 PROBLEM — M25.661 KNEE STIFFNESS, RIGHT: Status: ACTIVE | Noted: 2021-01-25

## 2024-11-01 PROBLEM — S84.11XA COMMON PERONEAL NERVE DYSFUNCTION OF RIGHT LOWER EXTREMITY: Status: ACTIVE | Noted: 2019-04-27
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
## 2024-11-01 PROBLEM — M21.371 RIGHT FOOT DROP: Status: ACTIVE | Noted: 2019-04-27

## 2024-11-01 PROBLEM — K29.30 CHRONIC SUPERFICIAL GASTRITIS WITHOUT BLEEDING: Status: ACTIVE | Noted: 2020-03-27

## 2024-11-01 PROBLEM — S83.519A ACL TEAR: Status: ACTIVE | Noted: 2018-06-29

## 2024-11-01 PROBLEM — S83.521A SPRAIN OF POSTERIOR CRUCIATE LIGAMENT OF RIGHT KNEE: Status: ACTIVE | Noted: 2018-11-30

## 2024-11-01 PROBLEM — M76.61 RIGHT ACHILLES TENDINITIS: Status: ACTIVE | Noted: 2021-01-25

## 2024-11-01 PROBLEM — I10 BENIGN ESSENTIAL HYPERTENSION: Status: ACTIVE | Noted: 2019-09-17

## 2024-11-01 PROBLEM — M1A.09X0 IDIOPATHIC CHRONIC GOUT OF MULTIPLE SITES WITHOUT TOPHUS: Status: ACTIVE | Noted: 2019-06-14

## 2024-11-01 PROBLEM — S83.429A: Status: ACTIVE | Noted: 2018-06-29

## 2024-11-01 PROBLEM — R29.898 RIGHT LEG WEAKNESS: Status: ACTIVE | Noted: 2021-01-25

## 2024-11-01 PROBLEM — R73.09 HEMOGLOBIN A1C GREATER THAN 9%, INDICATING POOR DIABETIC CONTROL: Status: ACTIVE | Noted: 2022-05-03

## 2024-11-01 PROBLEM — F17.200 SMOKER: Status: ACTIVE | Noted: 2020-12-16

## 2024-11-01 PROBLEM — E78.5 HYPERLIPIDEMIA LDL GOAL <70: Status: ACTIVE | Noted: 2019-06-14

## 2024-11-01 PROBLEM — F10.10 ALCOHOL ABUSE: Status: ACTIVE | Noted: 2019-06-13

## 2024-11-01 PROBLEM — R73.9 HYPERGLYCEMIA: Status: ACTIVE | Noted: 2019-06-14

## 2024-11-01 PROBLEM — S83.421A SPRAIN OF LATERAL COLLATERAL LIGAMENT OF RIGHT KNEE: Status: ACTIVE | Noted: 2018-11-30

## 2024-11-01 PROBLEM — T78.02XS: Status: ACTIVE | Noted: 2019-06-13

## 2024-11-01 PROBLEM — F12.91 MARIJUANA USE DISORDER IN REMISSION: Status: ACTIVE | Noted: 2019-06-13

## 2024-11-01 PROBLEM — S83.511A SPRAIN OF ANTERIOR CRUCIATE LIGAMENT OF RIGHT KNEE: Status: ACTIVE | Noted: 2018-08-09

## 2024-11-01 PROBLEM — I10 UNCONTROLLED HYPERTENSION: Status: ACTIVE | Noted: 2019-06-13

## 2024-11-01 PROBLEM — E55.9 VITAMIN D DEFICIENCY: Status: ACTIVE | Noted: 2019-06-13

## 2024-11-01 PROBLEM — D64.9 ANEMIA: Status: ACTIVE | Noted: 2019-06-14

## 2024-11-01 PROBLEM — S83.104A: Status: ACTIVE | Noted: 2021-10-11

## 2024-11-01 PROBLEM — E66.811 OBESITY (BMI 30.0-34.9): Status: ACTIVE | Noted: 2019-06-13

## 2024-11-01 PROBLEM — R21 RASH: Status: ACTIVE | Noted: 2024-11-01

## 2024-11-01 PROBLEM — B00.9 HERPESVIRAL INFECTION, UNSPECIFIED: Status: ACTIVE | Noted: 2019-02-21

## 2024-11-01 PROBLEM — G43.109 MIGRAINE WITH AURA AND WITHOUT STATUS MIGRAINOSUS, NOT INTRACTABLE: Status: ACTIVE | Noted: 2019-06-13

## 2024-11-01 PROBLEM — G57.31 COMMON PERONEAL NERVE DYSFUNCTION OF RIGHT LOWER EXTREMITY: Status: ACTIVE | Noted: 2019-04-27

## 2024-11-01 PROBLEM — E78.6 LOW HDL (UNDER 40): Status: ACTIVE | Noted: 2019-06-14

## 2024-11-04 PROBLEM — E11.10 DKA (DIABETIC KETOACIDOSIS) (MULTI): Status: RESOLVED | Noted: 2022-04-22 | Resolved: 2024-11-04

## 2024-11-04 PROBLEM — R73.09 HEMOGLOBIN A1C GREATER THAN 9%, INDICATING POOR DIABETIC CONTROL: Status: RESOLVED | Noted: 2022-05-03 | Resolved: 2024-11-04

## 2024-11-07 ENCOUNTER — APPOINTMENT (OUTPATIENT)
Dept: PAIN MEDICINE | Facility: CLINIC | Age: 39
End: 2024-11-07
Payer: COMMERCIAL

## 2024-11-26 ENCOUNTER — APPOINTMENT (OUTPATIENT)
Dept: PODIATRY | Facility: CLINIC | Age: 39
End: 2024-11-26
Payer: COMMERCIAL

## 2024-12-02 ENCOUNTER — APPOINTMENT (OUTPATIENT)
Dept: SLEEP MEDICINE | Facility: CLINIC | Age: 39
End: 2024-12-02
Payer: COMMERCIAL

## 2025-01-10 ENCOUNTER — HOSPITAL ENCOUNTER (EMERGENCY)
Facility: HOSPITAL | Age: 40
Discharge: HOME | End: 2025-01-10
Payer: COMMERCIAL

## 2025-01-10 VITALS
RESPIRATION RATE: 19 BRPM | OXYGEN SATURATION: 99 % | WEIGHT: 225 LBS | BODY MASS INDEX: 35.31 KG/M2 | SYSTOLIC BLOOD PRESSURE: 169 MMHG | TEMPERATURE: 97.5 F | DIASTOLIC BLOOD PRESSURE: 106 MMHG | HEART RATE: 91 BPM | HEIGHT: 67 IN

## 2025-01-10 DIAGNOSIS — L02.91 ABSCESS: Primary | ICD-10-CM

## 2025-01-10 PROCEDURE — 99283 EMERGENCY DEPT VISIT LOW MDM: CPT

## 2025-01-10 RX ORDER — DOXYCYCLINE 100 MG/1
100 TABLET ORAL 2 TIMES DAILY
Qty: 14 TABLET | Refills: 0 | Status: SHIPPED | OUTPATIENT
Start: 2025-01-10 | End: 2025-01-17

## 2025-01-10 ASSESSMENT — PAIN SCALES - GENERAL: PAINLEVEL_OUTOF10: 0 - NO PAIN

## 2025-01-10 ASSESSMENT — COLUMBIA-SUICIDE SEVERITY RATING SCALE - C-SSRS
1. IN THE PAST MONTH, HAVE YOU WISHED YOU WERE DEAD OR WISHED YOU COULD GO TO SLEEP AND NOT WAKE UP?: NO
6. HAVE YOU EVER DONE ANYTHING, STARTED TO DO ANYTHING, OR PREPARED TO DO ANYTHING TO END YOUR LIFE?: NO
2. HAVE YOU ACTUALLY HAD ANY THOUGHTS OF KILLING YOURSELF?: NO

## 2025-01-10 ASSESSMENT — PAIN - FUNCTIONAL ASSESSMENT: PAIN_FUNCTIONAL_ASSESSMENT: 0-10

## 2025-01-10 NOTE — ED PROVIDER NOTES
HPI   Chief Complaint   Patient presents with    Rash     Patient states that he has a rash or a bite on his back that hurts really bad. Noticed a few days ago.       HPI  See my MDM      Patient History   Past Medical History:   Diagnosis Date    Allergic     GERD (gastroesophageal reflux disease)     Hypertension     Insomnia      Past Surgical History:   Procedure Laterality Date    LEG SURGERY Right 2018    reconstruction of leg after work accident     Family History   Problem Relation Name Age of Onset    Diabetes Mother      Hypertension Mother       Social History     Tobacco Use    Smoking status: Some Days     Types: Cigars     Passive exposure: Never    Smokeless tobacco: Never   Vaping Use    Vaping status: Former   Substance Use Topics    Alcohol use: Yes     Comment: occasional    Drug use: Yes     Types: Marijuana       Physical Exam   ED Triage Vitals [01/10/25 1421]   Temperature Heart Rate Respirations BP   36.4 °C (97.5 °F) 91 19 (!) 169/106      Pulse Ox Temp Source Heart Rate Source Patient Position   99 % Oral Monitor Sitting      BP Location FiO2 (%)     Left arm --       Physical Exam    CONSTITUTIONAL: Vital signs reviewed as charted, well-developed and in no distress  Eyes: Extraocular muscles are intact. Pupils equal round and reactive to light. Conjunctiva are pink.    ENT: Mucous membranes are moist. Tongue in the midline. Pharynx was without erythema or exudates, uvula midline  LUNGS: Breath sounds equal and clear to auscultation. Good air exchange, no wheezes rales or retractions, pulse oximetry is charted.  HEART: Regular rate and rhythm without murmur thrill or rub, strong tones, auscultation is normal.  ABDOMEN: Soft and nontender without guarding rebound rigidity or mass. Bowel sounds are present and normal in all quadrants. There is no palpable masses or aneurysms identified. No hepatosplenomegaly, normal abdominal exam.  Neuro: The patient is awake, alert and oriented ×3. Moving  all 4 extremities and answering questions appropriately.   MUSCULOSKELETAL: The calves are nontender to palpation. Full gross active range of motion.   PSYCH: Awake alert oriented, normal mood and affect.  Skin:  Dry, normal color, warm to the touch, small area on the left upper back that is hard and red.  No fluctuance.  No drainage.    ED Course & MDM   Diagnoses as of 01/10/25 4077   Abscess                 No data recorded     [place] Coma Scale Score: 15 (01/10/25 1423 : Alexx Cross RN)                           Medical Decision Making  History obtained from: patient    Vital signs, nursing notes, current medications, past medical history, Surgical history, allergies, social history, family History were reviewed.         HPI:  Patient 39-year-old male presenting emergency room today concern for an abscess in the left upper back.  And over the last 3 to 4 days.  No fevers chills or night sweats.  No nausea vomiting diarrhea.  Denies drainage.  He is nontoxic and well-appearing      10 point ROS was reviewed and negative except Noted above in HPI.  DDX: as listed above          MDM Summary/considerations:  Labs Reviewed - No data to display  No orders to display     Medications - No data to display  Discharge Medication List as of 1/10/2025  2:31 PM        START taking these medications    Details   doxycycline (Adoxa) 100 mg tablet Take 1 tablet (100 mg) by mouth 2 times a day for 7 days. Take with a full glass of water and do not lie down for at least 30 minutes after, Starting Fri 1/10/2025, Until Fri 1/17/2025, Normal           I estimate there is LOW risk for EPIGLOTTITIS, PNEUMONIA, MENINGITIS, OR URINARY TRACT INFECTION, thus I consider the discharge disposition reasonable. Also, there is no evidence for peritonitis, sepsis, or toxicity. We have discussed the diagnosis and risks, and we agree with discharging home to follow-up with their primary doctor. We also discussed returning to the Emergency  Department immediately if new or worsening symptoms occur. We have discussed the symptoms which are most concerning (e.g., changing or worsening pain, trouble swallowing or breathing, neck stiffness, fever) that necessitate immediate return.    Will be started on antibiotics instructions on warm moist compresses.  Was discharged home stable condition.  Will follow PCP 1 to 2 days for reevaluation.    All of the patient's questions were answered to the best of my ability.  Patient states understanding that they have been screened for an emergency today and we have not found any etiology of symptoms that requires emergent treatment or admission to the hospital at this point. They understand that they have not had definitive care day and require follow-up for treatment of their condition. They also state understanding that they may have an emergent condition that may potentially have not of detected at this visit and they must return to the emergency department if they develop any worsening of symptoms or new complaints.      I have evaluated this patient, my supervising physician was available for consultation.              Critical Care: Not warranted at this time        This chart was completed using voice recognition transcription software. Please excuse any errors of transcription including grammatical, punctuation, syntax and spelling errors.  Please contact me with any questions regarding this chart.    Procedure  Procedures     MAKSIM Kimble-CNP  01/10/25 4535

## 2025-01-28 ENCOUNTER — APPOINTMENT (OUTPATIENT)
Dept: PODIATRY | Facility: CLINIC | Age: 40
End: 2025-01-28
Payer: COMMERCIAL

## 2025-02-05 ENCOUNTER — APPOINTMENT (OUTPATIENT)
Dept: PAIN MEDICINE | Facility: CLINIC | Age: 40
End: 2025-02-05
Payer: COMMERCIAL

## 2025-02-28 ENCOUNTER — APPOINTMENT (OUTPATIENT)
Dept: SLEEP MEDICINE | Facility: CLINIC | Age: 40
End: 2025-02-28
Payer: COMMERCIAL

## 2025-03-10 ENCOUNTER — APPOINTMENT (OUTPATIENT)
Dept: PAIN MEDICINE | Facility: CLINIC | Age: 40
End: 2025-03-10
Payer: COMMERCIAL

## 2025-04-01 ENCOUNTER — HOSPITAL ENCOUNTER (EMERGENCY)
Facility: HOSPITAL | Age: 40
Discharge: HOME | End: 2025-04-01
Attending: EMERGENCY MEDICINE
Payer: COMMERCIAL

## 2025-04-01 VITALS
WEIGHT: 190 LBS | HEART RATE: 77 BPM | SYSTOLIC BLOOD PRESSURE: 160 MMHG | RESPIRATION RATE: 18 BRPM | BODY MASS INDEX: 29.82 KG/M2 | TEMPERATURE: 97.9 F | HEIGHT: 67 IN | DIASTOLIC BLOOD PRESSURE: 110 MMHG | OXYGEN SATURATION: 100 %

## 2025-04-01 DIAGNOSIS — I10 HYPERTENSION, UNSPECIFIED TYPE: ICD-10-CM

## 2025-04-01 DIAGNOSIS — J06.9 VIRAL UPPER RESPIRATORY INFECTION: Primary | ICD-10-CM

## 2025-04-01 PROCEDURE — 2500000001 HC RX 250 WO HCPCS SELF ADMINISTERED DRUGS (ALT 637 FOR MEDICARE OP): Performed by: EMERGENCY MEDICINE

## 2025-04-01 PROCEDURE — 99282 EMERGENCY DEPT VISIT SF MDM: CPT | Performed by: EMERGENCY MEDICINE

## 2025-04-01 RX ORDER — OXYMETAZOLINE HCL 0.05 %
2 SPRAY, NON-AEROSOL (ML) NASAL ONCE
Status: COMPLETED | OUTPATIENT
Start: 2025-04-01 | End: 2025-04-01

## 2025-04-01 RX ORDER — GUAIFENESIN 100 MG/5ML
200 LIQUID ORAL 3 TIMES DAILY PRN
Qty: 120 ML | Refills: 0 | Status: SHIPPED | OUTPATIENT
Start: 2025-04-01 | End: 2025-04-06

## 2025-04-01 RX ORDER — PSEUDOEPHEDRINE HCL 30 MG
30 TABLET ORAL EVERY 4 HOURS PRN
Qty: 30 TABLET | Refills: 0 | Status: SHIPPED | OUTPATIENT
Start: 2025-04-01 | End: 2025-04-06

## 2025-04-01 RX ADMIN — OXYMETAZOLINE HYDROCHLORIDE 2 SPRAY: 0.5 SPRAY NASAL at 10:14

## 2025-04-01 ASSESSMENT — LIFESTYLE VARIABLES
EVER HAD A DRINK FIRST THING IN THE MORNING TO STEADY YOUR NERVES TO GET RID OF A HANGOVER: NO
HAVE YOU EVER FELT YOU SHOULD CUT DOWN ON YOUR DRINKING: NO
EVER FELT BAD OR GUILTY ABOUT YOUR DRINKING: NO
TOTAL SCORE: 0
HAVE PEOPLE ANNOYED YOU BY CRITICIZING YOUR DRINKING: NO

## 2025-04-01 ASSESSMENT — PAIN DESCRIPTION - PROGRESSION: CLINICAL_PROGRESSION: NOT CHANGED

## 2025-04-01 ASSESSMENT — PAIN SCALES - GENERAL: PAINLEVEL_OUTOF10: 0 - NO PAIN

## 2025-04-01 ASSESSMENT — PAIN - FUNCTIONAL ASSESSMENT: PAIN_FUNCTIONAL_ASSESSMENT: 0-10

## 2025-04-01 NOTE — ED PROVIDER NOTES
The patient was seen in conjunction with the advanced practice provider, and I performed a substantive portion of the visit. I personally saw the patient and made/approved the management plan and take responsibility for the patient management. All medical decision making was performed by me. All laboratory studies, radiology, and EKGs were reviewed by me.     History: 39-year-old male presents for 3 days of nasal congestion and sinus pressure.  Mild cough.  Family member sick with similar symptoms.  Physical exam:  Constitutional:  Awake, alert, well appearing, nontoxic  HEENT:  Normocephalic, atraumatic, oropharynx clear, no tonsillar edema or exudate, boggy nasal turbinates with nasal congestion present in mild tenderness over the frontal and maxillary sinuses bilaterally, tympanic membranes nonerythematous, nonbulging bilaterally  Neck: Trachea midline, no stridor  Respiratory/Chest: No respiratory distress, speaking full sentences, clear to auscultation bilaterally, no wheezes, rhonchi, or rales  CV:  Regular rate and regular rhythm, no murmurs, gallops, or rubs  Neuro: A/O, normal speech  Skin:  Warm and dry  MDM: Clinically symptoms are most consistent with viral syndrome.  I have considered alternative/bacterial etiologies such as otitis media, streptococcal pharyngitis, meningitis/encephalitis, pneumonia, sepsis, urinary tract infection/pyelonephritis among others however based on history and physical exam these have been ruled out and do not feel further testing is indicated at this time. Exam is overall benign and patient is well appearing, afebrile, and hemodynamically stable aside from hypertension which patient does have a history of.  Advised to follow-up with PCP for blood pressure recheck when feeling better.  Treated with Afrin with improvement.  Advised supportive care measures and appropriate for outpatient management.  Return precautions discussed.    Diagnoses as of 04/01/25 1005   Hypertension,  unspecified type   Viral upper respiratory infection            Caridad Simental MD  04/01/25 4778

## 2025-04-01 NOTE — ED TRIAGE NOTES
Pt having nasal congestion that started draining into his chest. Feels like its a sinus infection

## 2025-04-01 NOTE — DISCHARGE INSTRUCTIONS
Increase fluids  Warm compresses to the face help with sinus congestion  Salt water gargles for sore throat pain and drainage do not swallow  Tylenol Motrin for pain do not go the recommended daily dosage  Continue with the nasal spray but do not use longer than 3 days due to risk of severe rebound symptoms  Monitor for signs and symptoms of worsening infection or respiratory distress  Coolmist vaporizer in the home  Warm compresses to the face help with sinus congestion  Today that your blood pressure was elevated.  Journal your blood pressure daily and follow-up with your primary care physician for reevaluation.  You have been given information on hypertension return with any worsening symptoms or concerns  Sometimes medications can cause the blood pressure to be elevated to monitor closely.

## 2025-04-01 NOTE — Clinical Note
Vladislav Suresh was seen and treated in our emergency department on 4/1/2025.  He may return to work on 04/03/2025.  1-3 days      If you have any questions or concerns, please don't hesitate to call.      Caridad Simental MD

## 2025-04-01 NOTE — ED PROVIDER NOTES
Department of Emergency Medicine   ED  Provider Note  Admit Date/RoomTime: 4/1/2025  9:41 AM  ED Room: ST24/ST24        History of Present Illness:  Chief Complaint   Patient presents with    Nasal Congestion         Vladislav Suresh is a 39 y.o. male history of reflux, hypertension presented to the emergency department with facial pain and pressure draining into his chest onset of symptoms 3 to 4 days.  He denies any fever or chills.  No itchy watery eyes.  Complains of intermittent ear discomfort but no pain no dizziness no change in hearing.  Scratchy sore throat.  No drooling or difficulty swallowing.  Sore throat has improved.  Complains of occasional cough chest congestion no wheezing or shortness of breath no abdominal pain nausea vomiting.  Reports that most of his pain is a across his sinuses and brings tears to his eyes.  He has tried over-the-counter medications is hesitant to use the nasal sprays.  His children were sick with similar symptoms about a week ago.  In his significant other reports that she had similar symptoms.  Patient is a smoker    Review of Systems:   Pertinent positives and negatives are stated within HPI, all other systems reviewed and are negative.        --------------------------------------------- PAST HISTORY ---------------------------------------------  Past Medical History:  has a past medical history of Allergic, GERD (gastroesophageal reflux disease), Hypertension, and Insomnia.  Past Surgical History:  has a past surgical history that includes Leg Surgery (Right, 2018).  Social History:  reports that he has been smoking cigars. He has never been exposed to tobacco smoke. He has never used smokeless tobacco. He reports current alcohol use. He reports current drug use. Drug: Marijuana.  Family History: family history includes Diabetes in his mother; Hypertension in his mother.. Unless otherwise noted, family history is non contributory  The patient’s home medications have been  "reviewed.  Allergies: Shellfish containing products        ---------------------------------------------------PHYSICAL EXAM--------------------------------------    GENERAL APPEARANCE: Awake and alert.   VITAL SIGNS: As per the nurses' triage record.  Elevated blood pressure  HEENT: Normocephalic, atraumatic. Extraocular muscles are intact. Pupils equal round and reactive to light. Conjunctiva are pink. Negative scleral icterus. Mucous membranes are moist. Tongue in the midline. Pharynx was without erythema or exudates, uvula midline.  Tenderness to palpation to the maxillary and frontal sinuses.  NECK: Soft Nontender and supple, full gross ROM, no meningeal signs.  No nuchal rigidity stridor or trismus noted.  Trachea midline  CHEST: Nontender to palpation. Clear to auscultation bilaterally. No rales, rhonchi, or wheezing.  No signs of respiratory distress he is not hypoxic tachypneic  HEART: S1, S2. Regular rate and rhythm. No murmurs, gallops or rubs.  Strong and equal pulses in the extremities.   ABDOMEN: Soft, nontender, nondistended, positive bowel sounds, no palpable masses.  MUSCULCSKELETAL: Full gross active range of motion. Ambulating on own with no acute difficulties  NEUROLOGICAL: Awake, alert and oriented x 3. Power intact in the upper and lower extremities. Sensation is intact to light touch in the upper and lower extremities.   IMMUNOLOGICAL: No lymphatic streaking noted   DERM: No petechiae, rashes, or ecchymoses.          ------------------------- NURSING NOTES AND VITALS REVIEWED ---------------------------  The nursing notes within the ED encounter and vital signs as below have been reviewed by myself  BP (!) 160/110 (BP Location: Right arm, Patient Position: Sitting)   Pulse 77   Temp 36.6 °C (97.9 °F) (Oral)   Resp 18   Ht 1.702 m (5' 7\")   Wt 86.2 kg (190 lb)   SpO2 100%   BMI 29.76 kg/m²     Oxygen Saturation Interpretation: 100% room air      The patient’s available past medical " records and past encounters were reviewed.          -----------------------DIAGNOSTIC RESULTS------------------------  LABS:    Labs Reviewed - No data to display    As interpreted by me, the above displayed labs are abnormal. All other labs obtained during this visit were within normal range or not returned as of this dictation.            ------------------------------ ED COURSE/MEDICAL DECISION MAKING----------------------  Medical Decision Making:   Exam: A medically appropriate exam performed, outlined above, given the known history and presentation.    History obtained from: Review of medical record nursing notes patient patient's family      Social Determinants of Health considered during this visit: Takes care of himself at home is a smoker      PAST MEDICAL HISTORY/Chronic Conditions Affecting Care     has a past medical history of Allergic, GERD (gastroesophageal reflux disease), Hypertension, and Insomnia.       CC/HPI Summary, Social Determinants of health, Records Reviewed, DDx, testing done/not done, ED Course, Reassessment, disposition considerations/shared decision making with patient, consults, disposition:   Presents with sinus congestion drainage  Differentials include but not limited to viral illness viral sinusitis symptoms only present for 3 or 4 days.  He does have tenderness to palpation over the maxillary and frontal sinuses.  No signs of otitis media otitis externa or mastoiditis.  Posterior pharynx is not erythematous no sign of Nicho angina or peritonsillar abscess lungs are clear on exam no signs of respiratory distress low suspicion for pneumonia.  Advised on supportive care measures at home patient given Afrin advised only use for 3 days due to rebound effect.  Sudafed guaifenesin supportive care measures at home warm compresses to the face help with sinus congestion.  Coolmist vaporizer including fluids monitor for worsening signs of infection or respiratory distress.  Patient also  concerned about his blood pressure was on atenolol in the past but was stopped because of improvement of his blood pressure with diet modifications and lifestyle changes.  Patient was given referral to primary care physician.  Advised supportive care measures at home information on hypertension will hold blood pressure medication at this time pending his evaluation follow-up primary care and monitoring of his blood pressure to determine actual need.  No signs of chest pain.  He is urinating per his normal no headache and sinus pressure  Patient and family amenable to plan amendable to discharge CMT for discharge utilized discharge planning patient seen and evaluated with attending physician Dr. Simental   PROCEDURES  Unless otherwise noted below, none      CONSULTS:   None      Diagnoses as of 04/01/25 1031   Hypertension, unspecified type   Viral upper respiratory infection         This patient has remained hemodynamically stable during their ED course.      Critical Care: None      Counseling:  The emergency provider has spoken with the patient family and discussed today’s results, in addition to providing specific details for the plan of care and counseling regarding the diagnosis and prognosis.  Questions are answered at this time and they are agreeable with the plan.         --------------------------------- IMPRESSION AND DISPOSITION ---------------------------------    IMPRESSION  1. Viral upper respiratory infection    2. Hypertension, unspecified type        DISPOSITION  Disposition: Discharge home  Patient condition is stable        NOTE: This report was transcribed using voice recognition software. Every effort was made to ensure accuracy; however, inadvertent computerized transcription errors may be present      MAKSIM Angeles-OZZIE  04/01/25 1031

## 2025-04-08 PROBLEM — M25.561 RIGHT KNEE PAIN: Status: ACTIVE | Noted: 2018-07-03

## 2025-04-08 PROBLEM — G47.09 OTHER INSOMNIA: Status: ACTIVE | Noted: 2019-06-13

## 2025-04-08 PROBLEM — K21.9 GASTROESOPHAGEAL REFLUX DISEASE WITHOUT ESOPHAGITIS: Status: ACTIVE | Noted: 2019-06-13

## 2025-04-08 PROBLEM — R03.0 ELEVATED BLOOD PRESSURE READING: Status: ACTIVE | Noted: 2025-04-08

## 2025-04-08 PROBLEM — J30.1 SEASONAL ALLERGIC RHINITIS DUE TO POLLEN: Status: ACTIVE | Noted: 2019-06-13

## 2025-04-08 PROBLEM — B35.4 TINEA CORPORIS: Status: ACTIVE | Noted: 2024-10-08

## 2025-04-08 PROBLEM — R06.83 SNORING: Status: ACTIVE | Noted: 2019-06-13

## 2025-05-27 PROBLEM — B35.3 TINEA PEDIS OF BOTH FEET: Status: ACTIVE | Noted: 2025-05-27

## 2025-05-27 PROBLEM — F51.01 PRIMARY INSOMNIA: Status: ACTIVE | Noted: 2025-05-27

## 2025-05-27 NOTE — PROGRESS NOTES
Subjective   Patient ID:   Vladislav Suresh is a 39 y.o. male who presents for No chief complaint on file..  HPI  Not a new patient.  He no showed his last appointment with me.  Frequent no shows with pain management and podiatry.  Sleep study was ordered and not done.    Right elbow pain:  I gave Prednisone last visit.  No acute injury.  Symptoms x over a month.  X-ray in the ED in Sep 2024 was normal.  Still has good ROM.  Had previously been on Ibuprofen 800mg with success.    Allergic rhinitis:  Taking Zyrtec.  This is stable.    Shellfish allergy:  Has an Epipen.    GERD:  Taking Prilosec.  This is stable.    Right leg pain:  I referred to pain management last visit - he canceled and no showed.  Has had multiple surgeries in his knee and leg.  Hardware was put in.  Usually takes Ibuprofen 800mg.    Insomnia:  Sleep study was ordered and not done.  Has been on medications in the past without success.  Snoring.  Waking up gasping for air.    ED follow up:  Was in the ED on 9/21/24.  ED note reviewed.  Was having palpitations.  His mother had passed away prior to this.  EKG showed NSR with sinus arrhythmia  Labs and CXR were normal.  Symptoms have improved/resolved.    Tinea pedis:  I referred to podiatry last visit - he canceled and no showed.  Both feet.  Has tried OTC treatments without success.    Health maintenance:  Smoking: Current smoker.  Labs: Sep 2024.  Influenza: Declines    Review of Systems  12 point review of systems negative unless stated above in HPI    There were no vitals filed for this visit.    Physical Exam  General: Alert and oriented, well nourished, no acute distress.  Lungs: Clear to auscultation, non-labored respiration.  Heart: Normal rate, regular rhythm, no murmur, gallop or edema.  Neurologic: Awake, alert, and oriented X3, CN II-XII intact.  Psychiatric: Cooperative, appropriate mood and affect.    Assessment/Plan     Diagnoses and all orders for this visit:  Palpitations  Right leg  pain  Gastroesophageal reflux disease without esophagitis  Primary insomnia  Right elbow pain  Tinea pedis of both feet  Witnessed apneic spells  Snoring

## 2025-06-03 ENCOUNTER — APPOINTMENT (OUTPATIENT)
Dept: PRIMARY CARE | Facility: CLINIC | Age: 40
End: 2025-06-03
Payer: COMMERCIAL

## 2025-06-03 DIAGNOSIS — M79.604 RIGHT LEG PAIN: ICD-10-CM

## 2025-06-03 DIAGNOSIS — R06.81 WITNESSED APNEIC SPELLS: ICD-10-CM

## 2025-06-03 DIAGNOSIS — B35.3 TINEA PEDIS OF BOTH FEET: ICD-10-CM

## 2025-06-03 DIAGNOSIS — F51.01 PRIMARY INSOMNIA: ICD-10-CM

## 2025-06-03 DIAGNOSIS — R00.2 PALPITATIONS: Primary | ICD-10-CM

## 2025-06-03 DIAGNOSIS — R06.83 SNORING: ICD-10-CM

## 2025-06-03 DIAGNOSIS — M25.521 RIGHT ELBOW PAIN: ICD-10-CM

## 2025-06-03 DIAGNOSIS — K21.9 GASTROESOPHAGEAL REFLUX DISEASE WITHOUT ESOPHAGITIS: ICD-10-CM

## 2025-06-16 ENCOUNTER — APPOINTMENT (OUTPATIENT)
Dept: PRIMARY CARE | Facility: CLINIC | Age: 40
End: 2025-06-16
Payer: COMMERCIAL

## 2025-07-18 ENCOUNTER — APPOINTMENT (OUTPATIENT)
Dept: RADIOLOGY | Facility: HOSPITAL | Age: 40
End: 2025-07-18
Payer: COMMERCIAL

## 2025-07-18 ENCOUNTER — HOSPITAL ENCOUNTER (EMERGENCY)
Facility: HOSPITAL | Age: 40
Discharge: HOME | End: 2025-07-18
Attending: STUDENT IN AN ORGANIZED HEALTH CARE EDUCATION/TRAINING PROGRAM
Payer: COMMERCIAL

## 2025-07-18 VITALS
SYSTOLIC BLOOD PRESSURE: 153 MMHG | OXYGEN SATURATION: 98 % | BODY MASS INDEX: 34.06 KG/M2 | HEART RATE: 69 BPM | WEIGHT: 217 LBS | DIASTOLIC BLOOD PRESSURE: 102 MMHG | RESPIRATION RATE: 16 BRPM | HEIGHT: 67 IN | TEMPERATURE: 98.4 F

## 2025-07-18 DIAGNOSIS — S89.91XA INJURY OF RIGHT LOWER EXTREMITY, INITIAL ENCOUNTER: Primary | ICD-10-CM

## 2025-07-18 PROCEDURE — 73564 X-RAY EXAM KNEE 4 OR MORE: CPT | Mod: RT

## 2025-07-18 PROCEDURE — 99284 EMERGENCY DEPT VISIT MOD MDM: CPT | Performed by: STUDENT IN AN ORGANIZED HEALTH CARE EDUCATION/TRAINING PROGRAM

## 2025-07-18 PROCEDURE — 73610 X-RAY EXAM OF ANKLE: CPT | Mod: RIGHT SIDE | Performed by: RADIOLOGY

## 2025-07-18 PROCEDURE — 73564 X-RAY EXAM KNEE 4 OR MORE: CPT | Mod: RIGHT SIDE | Performed by: RADIOLOGY

## 2025-07-18 PROCEDURE — 73610 X-RAY EXAM OF ANKLE: CPT | Mod: RT

## 2025-07-18 PROCEDURE — 2500000001 HC RX 250 WO HCPCS SELF ADMINISTERED DRUGS (ALT 637 FOR MEDICARE OP): Performed by: STUDENT IN AN ORGANIZED HEALTH CARE EDUCATION/TRAINING PROGRAM

## 2025-07-18 RX ORDER — ACETAMINOPHEN 500 MG
1000 TABLET ORAL ONCE
Status: COMPLETED | OUTPATIENT
Start: 2025-07-18 | End: 2025-07-18

## 2025-07-18 RX ADMIN — ACETAMINOPHEN 1000 MG: 500 TABLET ORAL at 05:51

## 2025-07-18 ASSESSMENT — LIFESTYLE VARIABLES
HAVE YOU EVER FELT YOU SHOULD CUT DOWN ON YOUR DRINKING: NO
TOTAL SCORE: 0
HAVE PEOPLE ANNOYED YOU BY CRITICIZING YOUR DRINKING: NO
EVER HAD A DRINK FIRST THING IN THE MORNING TO STEADY YOUR NERVES TO GET RID OF A HANGOVER: NO
EVER FELT BAD OR GUILTY ABOUT YOUR DRINKING: NO

## 2025-07-18 ASSESSMENT — PAIN - FUNCTIONAL ASSESSMENT: PAIN_FUNCTIONAL_ASSESSMENT: 0-10

## 2025-07-18 NOTE — ED NOTES
Discussed discharge instructions with patient including signs and symptoms to return to emergency department. Patient educated on importance of follow up with PCP as well as compliance with all medications. Patient verbalizes understanding and declines additional questions or concerns. Patient ambulates to ED waiting room with steady gait upon discharge.       Amanda Saini RN  07/18/25 [id number]

## 2025-07-18 NOTE — ED PROVIDER NOTES
HPI   Chief Complaint   Patient presents with    Leg Pain     Pt stated he has had 6 reconstructive surgeries to his right knee. Pt stated he slipped yesterday and now has pain in his right knee and right ankle. Pt also stated both areas are swollen.       Right leg pain.  He states that he has had 6 surgeries on this leg previously.  Yesterday, he slipped on a wet floor.  He fell to the ground and hit his leg him.  He has been able to bear weight since then.  He is having the most pain in his knee and ankle.  He does have some degree of neuropathy in the leg already.  He was previously treated for blood pressure and diabetes but no longer requires medications.              Patient History   Medical History[1]  Surgical History[2]  Family History[3]  Social History[4]    Physical Exam   ED Triage Vitals [07/18/25 0534]   Temperature Heart Rate Respirations BP   36.9 °C (98.4 °F) 69 16 (!) 153/102      Pulse Ox Temp Source Heart Rate Source Patient Position   98 % Temporal -- --      BP Location FiO2 (%)     -- --       Physical Exam  HENT:      Head: Normocephalic.     Cardiovascular:      Comments: 2+ right dorsalis pedis pulse, regular    Musculoskeletal:      Comments: Tenderness to palpation of both the medial and lateral malleoli of right leg. Range of eversion/inversion and dorsiflexion/plantarflexion limited by pain. Patient able to flex right knee beyond 90 degrees. Minimal pain to palpation of both medial and lateral aspects of right knee.     Skin:     Comments: Previous surgical incision scars seen on both knee and ankle.     Neurological:      Mental Status: He is alert.      Comments: Sensation intact to light touch in distal right foot.  Motor intact in right leg.         ED Course & MDM   Diagnoses as of 07/18/25 0616   Injury of right lower extremity, initial encounter                 No data recorded     Kumar Coma Scale Score: 15 (07/18/25 0537 : Federico Huang, EMT)                            Medical Decision Making  X-rays interpreted by me without any acute fractures.  Patient and family member expressed desire to leave as they have a camping trip for the weekend planned.  He was advised to use Tylenol and ibuprofen, rest, compression, and follow-up with orthopedics.  Return precautions given for any worsening symptoms.  Parts of this chart were completed with dictation software, please excuse any errors in transcription.        Procedure  Procedures         [1]   Past Medical History:  Diagnosis Date    Allergic     GERD (gastroesophageal reflux disease)     Hypertension     Insomnia    [2]   Past Surgical History:  Procedure Laterality Date    LEG SURGERY Right 2018    reconstruction of leg after work accident   [3]   Family History  Problem Relation Name Age of Onset    Diabetes Mother      Hypertension Mother     [4]   Social History  Tobacco Use    Smoking status: Some Days     Types: Cigars     Passive exposure: Never    Smokeless tobacco: Never   Vaping Use    Vaping status: Former   Substance Use Topics    Alcohol use: Yes     Comment: occasional    Drug use: Yes     Types: Marijuana        Adolfo Newsome MD  07/18/25 8417

## 2025-07-18 NOTE — DISCHARGE INSTRUCTIONS
You should use [facility] to obtain final results of your x-rays.  You should follow-up with orthopedics.  You may use Tylenol and ibuprofen, ice, and compression.  Return to the emergency department with any worsening symptoms.    It is important to remember that your care does not end here and you must continue to monitor your condition closely. Please return to the emergency department for any worsening or concerning signs or symptoms as directed by our conversations and the discharge instructions. If you do not have a doctor please contact the referral number on your discharge instructions. Please contact any physician specialists provided in your discharge notes as it is very important to follow up with them regarding your condition. If you are unable to reach the physicians provided, please come back to the Emergency Department at any time.    Return to emergency room without delay for ANY new or worsening pains or for any other symptoms or concerns.  Return with worsening pains, nausea, vomiting, trouble breathing, palpitations, shortness of breath, inability to pass stool or urine, loss of control of stool or urine, any numbness or tingling (that is not normal for you), uncontrolled fevers, the passing of blood or other material in stool or urine, rashes, pains or for any other symptoms or concerns you may have.  You are always welcome to return to the ER at any time for any reason or for any other concerns you may have.

## 2025-07-18 NOTE — LETTER
July 20, 2025    Patient: Vladislav Suresh   YOB: 1985   Date of Visit: 7/18/2025       To Whom It May Concern:    Vladislav Suresh was seen and treated in our emergency department on 7/18/2025 and was discharged on 7/18/2025. He may return to work on 7/21/2025.    If you have any questions or concerns, please don't hesitate to call.         Dr. Newsome

## 2025-07-25 NOTE — PROGRESS NOTES
Verbal consent of the patient and/or verbal parental consent for patients under the age of 18 have been obtained to conduct a physical examination at this office visit.    The *** was present in the room during the entire visit including, but not limited to the physical examination.    New patient    History Of Present Illness  07/25/25 Vladislav Suresh is a 39 y.o. male who presents for an evaluation of their Right ***. Pt stated he has had 6 reconstructive surgeries to his right knee. Pt stated he slipped yesterday and now has pain in his right knee and right ankle. Pt also stated both areas are swollen.      All previous Progress Notes and imaging results related to this patients chief complaint have been reviewed in preparation for this examination.    Past Medical History  He has a past medical history of Allergic, GERD (gastroesophageal reflux disease), Hypertension, and Insomnia.    Surgical History  He has a past surgical history that includes Leg Surgery (Right, 2018).     Social History  He reports that he has been smoking cigars. He has never been exposed to tobacco smoke. He has never used smokeless tobacco. He reports current alcohol use. He reports current drug use. Drug: Marijuana.    Family History  Family History[1]     Allergies  Shellfish containing products    Historical Clinical Intake    Review of Systems  CONSTITUTIONAL:   Negative for weight change, loss of appetite, fatigue, weakness, fever, chills, night sweats, headaches .           HEENT:   Negative for cold, cough, sore throat, sinus pain, swollen lymph nodes.           OPHTHALMOLOGY:   Negative for diminished vision, blurred vision, loss of vision, double vision.           ALLERGY:   Negative for runny nose, scratchy throat, sinus congestion, rash, facial pressure, nasal congestion, post-nasal drip.           CARDIOLOGY:   Negative for chest pain, palpitations, murmurs, irregular heart beat, shortness of breath, leg edema, dyspnea on  exertion, fatigue, dizziness.           RESPIRATORY:   Negative for chest pain, shortness of breath, swelling of the legs, asthma/copd, chest congestion, pain with breathing .           GASTROENTEROLOGY:   Negative for nausea, vomitting, heartburn, constipation, diarrhea, blood in stool, change in bowel habits, black stool.           HEMATOLOGY/LYMPH:   Negative for fatigue, loss of appetitie, easy bruising, easy bleeding, anemia, abnormal bleeding, slow healing.           ENDOCRINOLOGY:   Negative for polyuria, polydipsia, polyphagia, fatigue, weight loss, weight gain, cold intolerance, heat intolerance, diabetes.           MUSCULOSKELETAL:   Positive  for {BJJLOCATION:12230} ***        DERMATOLOGY:   Negative for rash, bruising.           NEUROLOGY:   Negative for tingling, numbness, gait abnormality, paresthesias, weakness, sciatica.        Examination:  {BJJLOCATION:28010} {BJJLOCPROXIMITY:17785} {BJJLOCKNEEACLMCLMEDIALMENISCUS:48016}  Edema: Positive  Diffusely.   Effusion: Negative.   Percussion Test:  Positive  Lateral Femoral Condyle, Posterior Medial Tibial Plateau, Medial Femoral Condyle as well as Anterior and Posterior Medial Tibial Plateau.   Tuning Fork Test:  Positive Lateral Femoral Condyle, Posterior Medial Tibial Plateau, Medial Femoral Condyle as well as Anterior and Posterior Medial Tibial Plateau.   Ecchymosis/Bruising: Negative.            Palpation:   Positive  {BJJLOCATION:15562} {BJJLOCPROXIMITY:00483} {BJJLOCKNEEACLMCLMEDIALMENISCUS:97720}    Orientation: Asymmetrical: because of the swelling.     Range of Motion:    Positive Knee Flexion ( 0-135 degrees) Decreased because of pain and swelling  Positive Knee Extension ( 0-15 degrees) Decreased because of pain and swelling           Muscle Strength:    Positive {BJJSTRENGTH:14762} Quadricep Extension Causes pain  Positive {BJJSTRENGTH:63897} Hamstring Flexion Causes pain  +5+5 Gastrocnemius  +5+5 Soleus.            Proprioception:   Pain  "with Squat: Positive    Pain with Sumo Squat:  Positive   Hop Test: Positive    Single leg balance test Positive            Vascular:   +2/+4 Femoral  +2/+4 Dorsalis Pedis  +2/+4 Posterior Tibial  Capillary Refill less than 2 seconds.                Knee - ACL:  Anterior Drawer Test: Positive   Lachman Test: Positive    Lelli Test: Positive                    KNEE - PCL/POSTERIOR LATERAL CORNER:  Posterior Drawer Test: Negative  Dial Test: Negative {DIALTEST:15184::\" Increased ER at both 30 and 90 degrees suggests PCL and posterolateral structures injury                 \",\"Increased ER at 30 degrees but not 90 degrees isolated injury to posterolateral structures. \"} *Change with each examination appropriately based off of the findings*  Reverse Lachman Test: Negative     KNEE - POPLITEUS:  Mazin's Test: Positive     KNEE - MCL / LCL:  Valgus Stress Test: 90 degrees: Positive  20-30 degrees: Positive    Varus Stress Test:  90 degrees: Negative, 20-30 degrees: Negative.   Apley Distraction Test: Positive  Medial.   Thessaly Test: Positive  Origin and Insertion MCL, Anterior Medial Meniscus, Posterior Medial Meniscus, Distal Hamstring.            KNEE - MENISCUS:  Apley Compression Test:  Positive       Stienmann Test:  Positive   Kellen Test:  Positive     Bounce Home Test:  Positive  Thessaly Test:  Positive     KNEE - PATELLA:  Apprehension Test:  Positive  Glide Test:  Positive  Grind Test: Positive  Patella Tracking Test: Positive  Fat pad Impingement: Positive             KNEE - QUADRICEPS:         VMO Test: Positive   VLO Test:  Negative    Hip/Pelvis - Sacrum:  Leg Length Supine: Positive {BJJLEGLENGTH:02840}   Leg Length Supine to Seated (Derbolowsky Sign): Positive {BJJLEGLENGTH:08590}  Standing Flexion Test: Positive {BJJPOSNE} {BJJLOCATION:37437}  Seated Flexion Test: Positive {BJJPOSNE} {BJJLOCATION:73973}  Spring Test: {BJJPOSNE}   Sacral Somatic Dysfunction: Positive " {BJJSACRALSOMATICDYSFUNCTION:60285}  Hip Flexor Tightness: Positive {BJJTIGHTNESS:04588}  Hamstring Tightness: Positive {BJJTIGHTNESS:73384}          Feet/Foot: Positive {BJJLOCATION:46347} {BJJFEETVALGUSVARUS:01687}         Imaging and Diagnostics Review:  XR knee right 4+ views 7/18/2025 6:27 am  Narrative: Interpreted By:  Johnie Ortez,       FINDINGS:  No acute fractures or destructive lesions are identified.  Hardware is present, in a pattern suggestive of previous ACL repair.  Minimal degenerative changes involve the medial knee compartment.  The alignment is anatomic. The soft tissues are unremarkable. There  is no significant effusion in the suprapatellar bursa.      Impression:   Postsurgical and minimal degenerative changes without acute fracture  or subluxation.      Signed by: Johnie Ortez 7/18/2025 8:07 AM  Dictation workstation:   [id number]    MRI KNEE RT WO - 6/20/2018    FINDINGS:     The medial and lateral meniscus is intact.     Partial tearing of the PCL near the femoral attachment is noted. There is  complete rupture of the ACL noted. The MCL is intact. However, moderate  moderate edema is seen surrounding the MCL.     Complete rupture of the lateral collateral ligament and biceps femoris from  the fibular insertion is seen. Retraction of the biceps femoris tendon about  3.7 cm from the femoral insertion is noted. The IT band is intact.  High-grade partial tearing of the popliteus noted. Intramuscular edema seen  within the lateral head of the gastrocnemius as well as the soleus.     Severe soft tissue edema is noted about the knee. Lateral subluxation  patella is noted. Injury to the lateral retinaculum is suggested. There is  sprain of the medial patellofemoral ligament.     The lateral and medial tibiofemoral cartilage are unremarkable. The  patellofemoral cartilage is intact.     Mild joint effusion is noted. No Baker's cyst is identified.     No acute fracture or dislocation is  identified. Bone marrow contusion of the  mediofemoral condyle is noted.     IMPRESSION:   1. Complete rupture of the lateral collateral ligament and biceps femoris  insertion. IT band intact. Orthopedic surgical consult is advised. The  patient should remain nonweightbearing.  2. High-grade partial tearing of the popliteus.  3. Complete rupture ACL.  4. Partial tearing PCL the femoral attachment.  5. Grade 1 MCL sprain.  6. Sprain medial patellofemoral ligament.  7. Injury lateral retinaculum.  8. Mild lateral subluxation patella.  9. Strain of the soleus and lateral head of the gastrocnemius.  10.  Bone marrow contusion medial femoral condyle.     This report has been produced using speech recognition.        Original Interpreting Physician:   EDWIGE VALENTE MD  Original Transcribed by/Date: PSCB   Jun 20 2018  5:12P  Original Electronically Signed by/Date: EDWIGE VALENTE MD Jun 20 2018  5:12P     Addendum Interpreting Physician:  Addendum Transcribed by/Date: NO ADDENDUM  Addendum Electronically Signed by/Date:     -------------------------------------------------------------------------------------------  Examination:  {BJJLOCATION:06182} {BJJLOCPROXIMITY:27151} {BJJLOCFOOTANKLESTRESSFX:02958}  Edema: Positive.   Ecchymosis/Bruising: Positive.   Percussion Test: Positive {BJJLOCFOOTANKLESTRESSFX:48623}          Tuning Fork Test: Positive, {BJJLOCFOOTANKLESTRESSFX:10341}    Orientation:   Positive  Asymmetrical,because of the swelling.          ROM:   Positive, Decreased Toe flexion and extension due to pain and swelling.            Muscle Strength:   Positive {BJJSTRENGTH:94954} Planar Flexion, Decreased due to pain and swelling  Positive {BJJSTRENGTH:79919}  Dorsi Flexion, Decreased due to pain and swelling        +5/+5 Inversion  +5/+5 Eversion        +5/+5 Plantarflexion in combination with inversion  +5/+5 Plantarflexion in combination with eversion          +5/+5 Dorsiflexion in combination with inversion  (Posterior Tibialis)  +5/+5 Dorsiflexion in combination with eversion (Peroneal Brevis)  +5/+5 Dorsiflexion in combination with eversion and flexion of great toe (Peroneal Longus).            Palpation:   Positive, Painful and Tender to Palpation {BJJLOCATION:10269} {BJJLOCPROXIMITY:17855} {BJJLOCFOOTANKLESTRESSFX:38320}           Vascular:   +2/+4 Dorsalis Pedis  +2/+4 Posterior Tibialis  Capillary Refill < 2 Seconds.        Leg/Ankle/Foot - Ankle Impingement:  Posterior Ankle Impingement Test: Negative.   Anterior Ankle Impingement Test: Negative.         Leg/Ankle/Foot - Ankle Instability:  Flexibility Test:  Positive   Anterior Drawer Test:  Positive    Talar Tilt Test:  Positive   External Rotation Kleiger Plantar Flexion Test:  Positive   External Rotation Kleiger Dorsiflexion Test:  Positive   Squeeze Test:  Positive, Distal tibia and fibula.   Dorsiflexion Test:  Positive   Posterior Tibial Instability Test: Negative.  Peroneal Tendon Instability Test:  Positive  Horizontal Squeeze Test:  Positive  Vertical Squeeze Test:  Negative.   Plantarflexion:  Positive  Standing/Walking Test:  Positive, Painful.   Tibial Torsion Test:  Positive       Leg/Ankle/Foot - DVT:  Ernst's Sign: Negative.         Ankle/Foot - Forefoot:  Strunsky Test:  Negative.   Gaenslen Maneuver Test:  Negative.   Metatarsal Tap Test:  Positive  {BJJLOCEVERSIONANKLEINJURY:67620}  Crepitation Test:  Negative.         Leg/Ankle/Foot - Hindfoot Achilles/Calcaneus:  Valdovinos Compression Test: Negative.   Hoffa Sign: Negative.   Achilles Tendon Tap Test: Negative.   Heel Compression Test: Positive  .         Leg/Ankle/Foot - Nerve Irritation:  Bridger Sign: Negative.   Digital Nerve Stretch Test: Negative.   Tinel Sign: Negative.   Tourniquet Sign: Negative.         Feet/Foot:   Positive {BJJLOCATION:68592}  {BJJFEETVALGUSVARUS:77898}    XR ankle right 3+ views 7/18/2025 6:27 am  Narrative: Interpreted By:  Johnie Ortez ORDERING  CLINICIAN:  AKIKO BLAND      FINDINGS:  Bandages are present over the forefoot, obscuring some detail.  There is no fracture, blastic or lytic bone lesion. The ankle mortise  and subtalar joints are intact. Mild degenerative changes involve the  articulation between the navicular and cuneiforms, with small dorsal  osteophytes. The alignment is anatomic. The soft tissues are  unremarkable.      Impression:  No acute fracture or radiographic evidence of acute osteomyelitis.      Signed by: Johnie Ortez 7/18/2025 8:06 AM  Dictation workstation:   [id number]       Assessment   No diagnosis found.    Treatment or Intervention:  May continue to alternate ice and moist heat as needed  Reviewed handout degenerative joint disease of the knees, patellofemoral tracking syndrome, and/or patellar tendinitis in detail with the patient to the level of their understanding; a copy of this handout was provided to the patient at the time of this office visit.  Start into Physical Therapy 1-2 times a week for 8-10 weeks with manual therapy as well as dry needling and IASTM  Reviewed home exercises to be performed by the patient routinely  Stressed the importance of wearing shoes with good stability control to help with the biomechanics affecting the knees and lower extremities  Stressed the importance of wearing full foot insoles to help with the biomechanics affecting the knees and lower extremities  Recommendation over-the-counter calcium with vitamin-D 2 -3000+ milligrams a day, as well as OTC symphytum as directed daily to promote bony healing, in addition to a daily multivitamin.   Recommendation over-the-counter curcumin, turmeric, boswellia, as well as egg shell membrane as directed to aid with joint inflammation.  Recommendation over-the-counter Move Free for joint health.  Patient advised regarding the risks and/or potential adverse reactions and/or side effects of any prescribed medications along with any over-the-counter  medications or any supplements used. Patient advised to seek immediate medical care if any adverse reactions occur. The patient and/or patient(s) parent(s) verbalized their understanding  You have been ordered an MRI of the ***. Once you contact scheduling at [phone] and obtain the date and time of your MRI, contact our office at [phone] to schedule your follow-up appointment to review your results. Please note the results of your imaging will not be discussed over the telephone.   Discussed in detail with the patient to the level of their understanding the possibility in the future of regenerative injections versus corticosteroid injections versus viscosupplementation injections versus a combination  At the patient's next office visit, will provide appropriate __ millimeter heel lift to be placed in the RIGHT/LEFT shoe to accommodate for leg length discrepancy found on standing erect pelvis xray  Will discuss with the  and/or  as appropriate  Staff to initiate prior authorization process for viscosupplementation injections into the patient's RIGHT/LEFT/BILATERAL knees; staff will contact the patient to schedule injections once approval is received.  Follow-up after RIGHT/LEFT/BILATERAL knee MRI or in 2-4 weeks for a reevaluation and possible xray or sooner as needed          CHERRY YU on 7/25/25 at 12:25 PM.     Please note that this report has been produced using speech recognition software.  It may contain errors related to grammar, punctuation or spelling.  Electronically signed, but not reviewed.      Jonathan Cantrell D.O. FAOASM         [1]   Family History  Problem Relation Name Age of Onset    Diabetes Mother      Hypertension Mother

## 2025-07-28 ENCOUNTER — APPOINTMENT (OUTPATIENT)
Dept: ORTHOPEDIC SURGERY | Facility: CLINIC | Age: 40
End: 2025-07-28
Payer: COMMERCIAL

## 2025-07-30 NOTE — PROGRESS NOTES
Verbal consent of the patient and/or verbal parental consent for patients under the age of 18 have been obtained to conduct a physical examination at this office visit.    The *** was present in the room during the entire visit including, but not limited to the physical examination.    New patient    History Of Present Illness  07/30/25 Vladislav Suresh is a 39 y.o. male who presents for an evaluation of their Right ***. Pt stated he has had 6 reconstructive surgeries to his right knee. Pt stated he slipped yesterday and now has pain in his right knee and right ankle. Pt also stated both areas are swollen.      All previous Progress Notes and imaging results related to this patients chief complaint have been reviewed in preparation for this examination.    Past Medical History  He has a past medical history of Allergic, GERD (gastroesophageal reflux disease), Hypertension, and Insomnia.    Surgical History  He has a past surgical history that includes Leg Surgery (Right, 2018).     Social History  He reports that he has been smoking cigars. He has never been exposed to tobacco smoke. He has never used smokeless tobacco. He reports current alcohol use. He reports current drug use. Drug: Marijuana.    Family History  Family History[1]     Allergies  Shellfish containing products    Historical Clinical Intake    Review of Systems  CONSTITUTIONAL:   Negative for weight change, loss of appetite, fatigue, weakness, fever, chills, night sweats, headaches .           HEENT:   Negative for cold, cough, sore throat, sinus pain, swollen lymph nodes.           OPHTHALMOLOGY:   Negative for diminished vision, blurred vision, loss of vision, double vision.           ALLERGY:   Negative for runny nose, scratchy throat, sinus congestion, rash, facial pressure, nasal congestion, post-nasal drip.           CARDIOLOGY:   Negative for chest pain, palpitations, murmurs, irregular heart beat, shortness of breath, leg edema, dyspnea on  exertion, fatigue, dizziness.           RESPIRATORY:   Negative for chest pain, shortness of breath, swelling of the legs, asthma/copd, chest congestion, pain with breathing .           GASTROENTEROLOGY:   Negative for nausea, vomitting, heartburn, constipation, diarrhea, blood in stool, change in bowel habits, black stool.           HEMATOLOGY/LYMPH:   Negative for fatigue, loss of appetitie, easy bruising, easy bleeding, anemia, abnormal bleeding, slow healing.           ENDOCRINOLOGY:   Negative for polyuria, polydipsia, polyphagia, fatigue, weight loss, weight gain, cold intolerance, heat intolerance, diabetes.           MUSCULOSKELETAL:   Positive  for {BJJLOCATION:69099} ***        DERMATOLOGY:   Negative for rash, bruising.           NEUROLOGY:   Negative for tingling, numbness, gait abnormality, paresthesias, weakness, sciatica.        Examination:  {BJJLOCATION:70965} {BJJLOCPROXIMITY:39942} {BJJLOCKNEEACLMCLMEDIALMENISCUS:30713}  Edema: Positive  Diffusely.   Effusion: Negative.   Percussion Test:  Positive  Lateral Femoral Condyle, Posterior Medial Tibial Plateau, Medial Femoral Condyle as well as Anterior and Posterior Medial Tibial Plateau.   Tuning Fork Test:  Positive Lateral Femoral Condyle, Posterior Medial Tibial Plateau, Medial Femoral Condyle as well as Anterior and Posterior Medial Tibial Plateau.   Ecchymosis/Bruising: Negative.            Palpation:   Positive  {BJJLOCATION:60066} {BJJLOCPROXIMITY:48504} {BJJLOCKNEEACLMCLMEDIALMENISCUS:79426}    Orientation: Asymmetrical: because of the swelling.     Range of Motion:    Positive Knee Flexion ( 0-135 degrees) Decreased because of pain and swelling  Positive Knee Extension ( 0-15 degrees) Decreased because of pain and swelling           Muscle Strength:    Positive {BJJSTRENGTH:80753} Quadricep Extension Causes pain  Positive {BJJSTRENGTH:24068} Hamstring Flexion Causes pain  +5+5 Gastrocnemius  +5+5 Soleus.            Proprioception:   Pain  "with Squat: Positive    Pain with Sumo Squat:  Positive   Hop Test: Positive    Single leg balance test Positive            Vascular:   +2/+4 Femoral  +2/+4 Dorsalis Pedis  +2/+4 Posterior Tibial  Capillary Refill less than 2 seconds.                Knee - ACL:  Anterior Drawer Test: Positive   Lachman Test: Positive    Lelli Test: Positive                    KNEE - PCL/POSTERIOR LATERAL CORNER:  Posterior Drawer Test: Negative  Dial Test: Negative {DIALTEST:72916::\" Increased ER at both 30 and 90 degrees suggests PCL and posterolateral structures injury                 \",\"Increased ER at 30 degrees but not 90 degrees isolated injury to posterolateral structures. \"} *Change with each examination appropriately based off of the findings*  Reverse Lachman Test: Negative     KNEE - POPLITEUS:  Mazin's Test: Positive     KNEE - MCL / LCL:  Valgus Stress Test: 90 degrees: Positive  20-30 degrees: Positive    Varus Stress Test:  90 degrees: Negative, 20-30 degrees: Negative.   Apley Distraction Test: Positive  Medial.   Thessaly Test: Positive  Origin and Insertion MCL, Anterior Medial Meniscus, Posterior Medial Meniscus, Distal Hamstring.            KNEE - MENISCUS:  Apley Compression Test:  Positive       Stienmann Test:  Positive   Kellen Test:  Positive     Bounce Home Test:  Positive  Thessaly Test:  Positive     KNEE - PATELLA:  Apprehension Test:  Positive  Glide Test:  Positive  Grind Test: Positive  Patella Tracking Test: Positive  Fat pad Impingement: Positive             KNEE - QUADRICEPS:         VMO Test: Positive   VLO Test:  Negative    Hip/Pelvis - Sacrum:  Leg Length Supine: Positive {BJJLEGLENGTH:12452}   Leg Length Supine to Seated (Derbolowsky Sign): Positive {BJJLEGLENGTH:15386}  Standing Flexion Test: Positive {BJJPOSNE} {BJJLOCATION:11168}  Seated Flexion Test: Positive {BJJPOSNE} {BJJLOCATION:44612}  Spring Test: {BJJPOSNE}   Sacral Somatic Dysfunction: Positive " {BJJSACRALSOMATICDYSFUNCTION:90020}  Hip Flexor Tightness: Positive {BJJTIGHTNESS:01500}  Hamstring Tightness: Positive {BJJTIGHTNESS:50464}          Feet/Foot: Positive {BJJLOCATION:38894} {BJJFEETVALGUSVARUS:83919}         Imaging and Diagnostics Review:  XR knee right 4+ views 7/18/2025 6:27 am  Narrative: Interpreted By:  Johnie Ortez,       FINDINGS:  No acute fractures or destructive lesions are identified.  Hardware is present, in a pattern suggestive of previous ACL repair.  Minimal degenerative changes involve the medial knee compartment.  The alignment is anatomic. The soft tissues are unremarkable. There  is no significant effusion in the suprapatellar bursa.      Impression:   Postsurgical and minimal degenerative changes without acute fracture  or subluxation.      Signed by: Johnie Ortez 7/18/2025 8:07 AM  Dictation workstation:   [id number]    MRI KNEE RT WO - 6/20/2018    FINDINGS:     The medial and lateral meniscus is intact.     Partial tearing of the PCL near the femoral attachment is noted. There is  complete rupture of the ACL noted. The MCL is intact. However, moderate  moderate edema is seen surrounding the MCL.     Complete rupture of the lateral collateral ligament and biceps femoris from  the fibular insertion is seen. Retraction of the biceps femoris tendon about  3.7 cm from the femoral insertion is noted. The IT band is intact.  High-grade partial tearing of the popliteus noted. Intramuscular edema seen  within the lateral head of the gastrocnemius as well as the soleus.     Severe soft tissue edema is noted about the knee. Lateral subluxation  patella is noted. Injury to the lateral retinaculum is suggested. There is  sprain of the medial patellofemoral ligament.     The lateral and medial tibiofemoral cartilage are unremarkable. The  patellofemoral cartilage is intact.     Mild joint effusion is noted. No Baker's cyst is identified.     No acute fracture or dislocation is  identified. Bone marrow contusion of the  mediofemoral condyle is noted.     IMPRESSION:   1. Complete rupture of the lateral collateral ligament and biceps femoris  insertion. IT band intact. Orthopedic surgical consult is advised. The  patient should remain nonweightbearing.  2. High-grade partial tearing of the popliteus.  3. Complete rupture ACL.  4. Partial tearing PCL the femoral attachment.  5. Grade 1 MCL sprain.  6. Sprain medial patellofemoral ligament.  7. Injury lateral retinaculum.  8. Mild lateral subluxation patella.  9. Strain of the soleus and lateral head of the gastrocnemius.  10.  Bone marrow contusion medial femoral condyle.     This report has been produced using speech recognition.        Original Interpreting Physician:   EDWIGE VALENTE MD  Original Transcribed by/Date: PSCB   Jun 20 2018  5:12P  Original Electronically Signed by/Date: EDWIGE VALENTE MD Jun 20 2018  5:12P     Addendum Interpreting Physician:  Addendum Transcribed by/Date: NO ADDENDUM  Addendum Electronically Signed by/Date:     -------------------------------------------------------------------------------------------  Examination:  {BJJLOCATION:42894} {BJJLOCPROXIMITY:34636} {BJJLOCFOOTANKLESTRESSFX:77753}  Edema: Positive.   Ecchymosis/Bruising: Positive.   Percussion Test: Positive {BJJLOCFOOTANKLESTRESSFX:56645}          Tuning Fork Test: Positive, {BJJLOCFOOTANKLESTRESSFX:48509}    Orientation:   Positive  Asymmetrical,because of the swelling.          ROM:   Positive, Decreased Toe flexion and extension due to pain and swelling.            Muscle Strength:   Positive {BJJSTRENGTH:69024} Planar Flexion, Decreased due to pain and swelling  Positive {BJJSTRENGTH:00694}  Dorsi Flexion, Decreased due to pain and swelling        +5/+5 Inversion  +5/+5 Eversion        +5/+5 Plantarflexion in combination with inversion  +5/+5 Plantarflexion in combination with eversion          +5/+5 Dorsiflexion in combination with inversion  (Posterior Tibialis)  +5/+5 Dorsiflexion in combination with eversion (Peroneal Brevis)  +5/+5 Dorsiflexion in combination with eversion and flexion of great toe (Peroneal Longus).            Palpation:   Positive, Painful and Tender to Palpation {BJJLOCATION:54297} {BJJLOCPROXIMITY:08516} {BJJLOCFOOTANKLESTRESSFX:60880}           Vascular:   +2/+4 Dorsalis Pedis  +2/+4 Posterior Tibialis  Capillary Refill < 2 Seconds.        Leg/Ankle/Foot - Ankle Impingement:  Posterior Ankle Impingement Test: Negative.   Anterior Ankle Impingement Test: Negative.         Leg/Ankle/Foot - Ankle Instability:  Flexibility Test:  Positive   Anterior Drawer Test:  Positive    Talar Tilt Test:  Positive   External Rotation Kleiger Plantar Flexion Test:  Positive   External Rotation Kleiger Dorsiflexion Test:  Positive   Squeeze Test:  Positive, Distal tibia and fibula.   Dorsiflexion Test:  Positive   Posterior Tibial Instability Test: Negative.  Peroneal Tendon Instability Test:  Positive  Horizontal Squeeze Test:  Positive  Vertical Squeeze Test:  Negative.   Plantarflexion:  Positive  Standing/Walking Test:  Positive, Painful.   Tibial Torsion Test:  Positive       Leg/Ankle/Foot - DVT:  Ernst's Sign: Negative.         Ankle/Foot - Forefoot:  Strunsky Test:  Negative.   Gaenslen Maneuver Test:  Negative.   Metatarsal Tap Test:  Positive  {BJJLOCEVERSIONANKLEINJURY:46865}  Crepitation Test:  Negative.         Leg/Ankle/Foot - Hindfoot Achilles/Calcaneus:  Valdovinos Compression Test: Negative.   Hoffa Sign: Negative.   Achilles Tendon Tap Test: Negative.   Heel Compression Test: Positive  .         Leg/Ankle/Foot - Nerve Irritation:  Bridger Sign: Negative.   Digital Nerve Stretch Test: Negative.   Tinel Sign: Negative.   Tourniquet Sign: Negative.         Feet/Foot:   Positive {BJJLOCATION:20328}  {BJJFEETVALGUSVARUS:05331}    XR ankle right 3+ views 7/18/2025 6:27 am  Narrative: Interpreted By:  Johnie Ortez ORDERING  CLINICIAN:  AKIKO BLAND      FINDINGS:  Bandages are present over the forefoot, obscuring some detail.  There is no fracture, blastic or lytic bone lesion. The ankle mortise  and subtalar joints are intact. Mild degenerative changes involve the  articulation between the navicular and cuneiforms, with small dorsal  osteophytes. The alignment is anatomic. The soft tissues are  unremarkable.      Impression:  No acute fracture or radiographic evidence of acute osteomyelitis.      Signed by: Johnie Ortez 7/18/2025 8:06 AM  Dictation workstation:   [id number]       Assessment   No diagnosis found.    Treatment or Intervention:  May continue to alternate ice and moist heat as needed  Reviewed handout degenerative joint disease of the knees, patellofemoral tracking syndrome, and/or patellar tendinitis in detail with the patient to the level of their understanding; a copy of this handout was provided to the patient at the time of this office visit.  Start into Physical Therapy 1-2 times a week for 8-10 weeks with manual therapy as well as dry needling and IASTM  Reviewed home exercises to be performed by the patient routinely  Stressed the importance of wearing shoes with good stability control to help with the biomechanics affecting the knees and lower extremities  Stressed the importance of wearing full foot insoles to help with the biomechanics affecting the knees and lower extremities  Recommendation over-the-counter calcium with vitamin-D 2 -3000+ milligrams a day, as well as OTC symphytum as directed daily to promote bony healing, in addition to a daily multivitamin.   Recommendation over-the-counter curcumin, turmeric, boswellia, as well as egg shell membrane as directed to aid with joint inflammation.  Recommendation over-the-counter Move Free for joint health.  Patient advised regarding the risks and/or potential adverse reactions and/or side effects of any prescribed medications along with any over-the-counter  medications or any supplements used. Patient advised to seek immediate medical care if any adverse reactions occur. The patient and/or patient(s) parent(s) verbalized their understanding  You have been ordered an MRI of the ***. Once you contact scheduling at [phone] and obtain the date and time of your MRI, contact our office at [phone] to schedule your follow-up appointment to review your results. Please note the results of your imaging will not be discussed over the telephone.   Discussed in detail with the patient to the level of their understanding the possibility in the future of regenerative injections versus corticosteroid injections versus viscosupplementation injections versus a combination  At the patient's next office visit, will provide appropriate __ millimeter heel lift to be placed in the RIGHT/LEFT shoe to accommodate for leg length discrepancy found on standing erect pelvis xray  Will discuss with the  and/or  as appropriate  Staff to initiate prior authorization process for viscosupplementation injections into the patient's RIGHT/LEFT/BILATERAL knees; staff will contact the patient to schedule injections once approval is received.  Follow-up after RIGHT/LEFT/BILATERAL knee MRI or in 2-4 weeks for a reevaluation and possible xray or sooner as needed          CANDELARIA TSAI on 7/30/25 at 9:06 AM.     Please note that this report has been produced using speech recognition software.  It may contain errors related to grammar, punctuation or spelling.  Electronically signed, but not reviewed.      Jonathan Cantrell D.O. FAOASM         [1]   Family History  Problem Relation Name Age of Onset    Diabetes Mother      Hypertension Mother        Musculature, and SI joint            Vascular:   Capillary Refill < 2 seconds  +2/+4Carotid  +2/+4 Dorsalis Pedis  +2/+4 Posterior Tibial.                Low Back-Disc Injury:  Valsalva Maneuver: Negative.   Fermoral Nerve Traction Test: Positive bilateral   Slump Test: Positive bilateral   Cross Test Seated: Positive bilateral   Seated Straight Leg Raise: Positive    Laseague Sign: Positive    Laseague Differential Test: Positive    [place]ague Drop Test: Positive    Seated Laseague Test: Positive     Reverse Laseague Test: Positive    Tip Toe Heel Walking Test: Negative.   Hailey Prone Knee Flexion Test: Positive      Multifidus Toe Touch Test (MT3): Negative.  Prone Instability Test (PIT): Negative  Multifidus Lift Test (MLT): Negative       Low Back-SI Joint:  Three-Phase Hyperextension Test: Positive    Yeoman Test: Negative.   Sacroilliac Stress Test: Positive     Abduction Stress Test: Positive            Low Back-Spondy:  Stork Test: Negative.   Sphinx Test: Negative.   Modified Sphinx Test: Negative.            Lumbar Somatic Dysfunction:    Sacrum: Test at a later date  Standing Flexion Test: Positive    Seated Flexion Test: Positive   Spring Test:    Sacral Somatic Dysfunction: Positive   Hip Flexor Tightness: Positive   Hamstring Tightness: Positive       Leg Length:  Leg Length Supine: Positive    Leg Length Supine to Seated (Derbolowsky Sign): Positive      Feet/Foot:   Positive  BILATERAL Valgus foot      Assessment   1. Primary osteoarthritis of right foot        2. Right Achilles tendinitis  MR ankle right wo IV contrast    Referral to Physical Therapy    ketorolac (Toradol) injection 30 mg    methylPREDNISolone acetate (DEPO-Medrol) injection 40 mg    methylPREDNISolone sodium succinate (PF) (SOLU-Medrol) injection 125 mg    predniSONE (Deltasone) 20 mg tablet    cyclobenzaprine (Flexeril) 10 mg tablet      3. Right leg weakness  MR ankle right wo IV contrast    Referral to Physical Therapy     ketorolac (Toradol) injection 30 mg    methylPREDNISolone acetate (DEPO-Medrol) injection 40 mg    methylPREDNISolone sodium succinate (PF) (SOLU-Medrol) injection 125 mg    predniSONE (Deltasone) 20 mg tablet    cyclobenzaprine (Flexeril) 10 mg tablet      4. History of ankle surgery  MR ankle right wo IV contrast    Referral to Physical Therapy    ketorolac (Toradol) injection 30 mg    methylPREDNISolone acetate (DEPO-Medrol) injection 40 mg    methylPREDNISolone sodium succinate (PF) (SOLU-Medrol) injection 125 mg    predniSONE (Deltasone) 20 mg tablet    cyclobenzaprine (Flexeril) 10 mg tablet      5. Rupture of right gastrocnemius tendon, initial encounter  MR ankle right wo IV contrast    Referral to Physical Therapy    ketorolac (Toradol) injection 30 mg    methylPREDNISolone acetate (DEPO-Medrol) injection 40 mg    methylPREDNISolone sodium succinate (PF) (SOLU-Medrol) injection 125 mg    predniSONE (Deltasone) 20 mg tablet    cyclobenzaprine (Flexeril) 10 mg tablet      6. Chronic pain of right knee  MR knee right wo IV contrast    Referral to Physical Therapy    ketorolac (Toradol) injection 30 mg    methylPREDNISolone acetate (DEPO-Medrol) injection 40 mg    methylPREDNISolone sodium succinate (PF) (SOLU-Medrol) injection 125 mg    predniSONE (Deltasone) 20 mg tablet    cyclobenzaprine (Flexeril) 10 mg tablet      7. Knee stiffness, right  MR knee right wo IV contrast    Referral to Physical Therapy    ketorolac (Toradol) injection 30 mg    methylPREDNISolone acetate (DEPO-Medrol) injection 40 mg    methylPREDNISolone sodium succinate (PF) (SOLU-Medrol) injection 125 mg    predniSONE (Deltasone) 20 mg tablet    cyclobenzaprine (Flexeril) 10 mg tablet      8. Hx of anterior cruciate ligament tear reconstruction  MR knee right wo IV contrast    Referral to Physical Therapy    ketorolac (Toradol) injection 30 mg    methylPREDNISolone acetate (DEPO-Medrol) injection 40 mg    methylPREDNISolone sodium  succinate (PF) (SOLU-Medrol) injection 125 mg    predniSONE (Deltasone) 20 mg tablet    cyclobenzaprine (Flexeril) 10 mg tablet      9. Acute medial meniscal injury of right knee, initial encounter  MR knee right wo IV contrast    Referral to Physical Therapy    ketorolac (Toradol) injection 30 mg    methylPREDNISolone acetate (DEPO-Medrol) injection 40 mg    methylPREDNISolone sodium succinate (PF) (SOLU-Medrol) injection 125 mg    predniSONE (Deltasone) 20 mg tablet    cyclobenzaprine (Flexeril) 10 mg tablet      10. Impingement syndrome involving patellar fat pad of right knee  MR knee right wo IV contrast    Referral to Physical Therapy    ketorolac (Toradol) injection 30 mg    methylPREDNISolone acetate (DEPO-Medrol) injection 40 mg    methylPREDNISolone sodium succinate (PF) (SOLU-Medrol) injection 125 mg    predniSONE (Deltasone) 20 mg tablet    cyclobenzaprine (Flexeril) 10 mg tablet      11. Acute bilateral low back pain with right-sided sciatica  XR lumbar spine complete 4+ views    MR lumbar spine wo IV contrast    Referral to Physical Therapy    ketorolac (Toradol) injection 30 mg    methylPREDNISolone acetate (DEPO-Medrol) injection 40 mg    methylPREDNISolone sodium succinate (PF) (SOLU-Medrol) injection 125 mg    predniSONE (Deltasone) 20 mg tablet    cyclobenzaprine (Flexeril) 10 mg tablet      12. Right foot drop  XR lumbar spine complete 4+ views    MR lumbar spine wo IV contrast    Referral to Physical Therapy    ketorolac (Toradol) injection 30 mg    methylPREDNISolone acetate (DEPO-Medrol) injection 40 mg    methylPREDNISolone sodium succinate (PF) (SOLU-Medrol) injection 125 mg    predniSONE (Deltasone) 20 mg tablet    cyclobenzaprine (Flexeril) 10 mg tablet      13. Lumbar strain, initial encounter  XR lumbar spine complete 4+ views    MR lumbar spine wo IV contrast    Referral to Physical Therapy    ketorolac (Toradol) injection 30 mg    methylPREDNISolone acetate (DEPO-Medrol) injection 40  mg    methylPREDNISolone sodium succinate (PF) (SOLU-Medrol) injection 125 mg    predniSONE (Deltasone) 20 mg tablet    cyclobenzaprine (Flexeril) 10 mg tablet      14. Numbness and tingling of right leg  XR lumbar spine complete 4+ views    MR lumbar spine wo IV contrast    Referral to Physical Therapy    ketorolac (Toradol) injection 30 mg    methylPREDNISolone acetate (DEPO-Medrol) injection 40 mg    methylPREDNISolone sodium succinate (PF) (SOLU-Medrol) injection 125 mg    predniSONE (Deltasone) 20 mg tablet    cyclobenzaprine (Flexeril) 10 mg tablet      15. History of right knee surgery        16. Valgus foot deformity, acquired, unspecified laterality        17. Acquired leg length discrepancy            Treatment or Intervention:  May continue to alternate ice and moist heat as needed  Reviewed knee instability, degenerative joint disease of the knees, patellofemoral tracking syndrome, and/or patellar tendinitis in detail with the patient to the level of their understanding;  at the time of this office visit.  Reviewed anatomy around the right ankle as well as how his tendon transfer and Achilles lengthening has affected the ankle  Reviewed anatomy of lower back with possible disc pathology  Start into Physical Therapy 1-2 times a week for 8-10 weeks with manual therapy as well as dry needling and IASTM and lumbar traction  Reviewed home exercises to be performed by the patient routinely  Stressed the importance of wearing shoes with good stability control to help with the biomechanics affecting the knees and lower extremities  Stressed the importance of wearing full foot insoles to help with the biomechanics affecting the knees and lower extremities  Recommendation over-the-counter calcium with vitamin-D 2 -3000+ milligrams a day, as well as OTC symphytum as directed daily to promote bony healing, in addition to a daily multivitamin.   Recommendation over-the-counter curcumin, turmeric, boswellia, as well  as egg shell membrane as directed to aid with joint inflammation.  Recommendation over-the-counter Move Free for joint health.  TREATMENT PLAN: Patient received IM injection of SoluMedrol 125 milligrams at the time of this office visit. , Patient received IM injection of Toradol 60 milligrams at the time of this office visit., and Patient received IM injection of DepoMedrol 40 milligrams at the time of this office visit.  10 DAY TAPER:  The following prescription was electronically sent to the patient's pharmacy of record: Prednisone 20mg, take 4 tablets a day x 4 days, 3 tabletss a day x 3 days, 2 tablets a day x 2 days, and 1 tablet a day x 1 day with food; start tomorrow if received Solu-Medrol injection in the office at visit, otherwise start immediately, dispense quantity sufficient, with no refills   The following prescription was electronically sent to the patient's pharmacy of record: Flexeril (Cyclobenzaprine) 10 milligrams, may take 1 tablet 1-2 hours before bedtime as needed for muscle relaxation, Duration: 30 days, Dispense: #30, Refill: 0  Patient advised regarding the risks and/or potential adverse reactions and/or side effects of any prescribed medications along with any over-the-counter medications or any supplements used. Patient advised to seek immediate medical care if any adverse reactions occur. The patient and/or patient(s) parent(s) verbalized their understanding  MRI of the right knee [facility] protocol to rule out ligament or tendon injury versus stress fracture versus other plus further evaluate the anatomy of the knee status post his large knee surgery previously  MRI of the right ankle to rule out ligament or tendon injury or stress fracture versus other plus further evaluate the extent of the ankle after previous surgeries  MRI lumbar spine herniated disc for stress fracture versus other  You have been ordered an MRI of the Right knee, Right ankle, Lumbar spine. Once you contact scheduling at  [phone] and obtain the date and time of your MRI, contact our office at [phone] to schedule your follow-up appointment to review your results. Please note the results of your imaging will not be discussed over the telephone.   Discussed in detail with the patient to the level of their understanding the possibility in the future of regenerative injections versus corticosteroid injections versus viscosupplementation injections versus a combination  Follow-up after MRIs [facility] protocol of the right knee as well as separately MRI of the right ankle and MRI of the lumbar spine        ISSA CANTRELL on 8/4/25 at 12:10 PM.     Please note that this report has been produced using speech recognition software.  It may contain errors related to grammar, punctuation or spelling.  Electronically signed, but not reviewed.      Issa Cantrell D.O. FAOASM         [1]   Family History  Problem Relation Name Age of Onset    Diabetes Mother      Hypertension Mother

## 2025-08-04 ENCOUNTER — HOSPITAL ENCOUNTER (OUTPATIENT)
Dept: RADIOLOGY | Facility: CLINIC | Age: 40
Discharge: HOME | End: 2025-08-04
Payer: COMMERCIAL

## 2025-08-04 ENCOUNTER — APPOINTMENT (OUTPATIENT)
Dept: ORTHOPEDIC SURGERY | Facility: CLINIC | Age: 40
End: 2025-08-04
Payer: COMMERCIAL

## 2025-08-04 VITALS
WEIGHT: 217 LBS | SYSTOLIC BLOOD PRESSURE: 150 MMHG | HEIGHT: 67 IN | BODY MASS INDEX: 34.06 KG/M2 | DIASTOLIC BLOOD PRESSURE: 96 MMHG | HEART RATE: 72 BPM

## 2025-08-04 DIAGNOSIS — M76.61 RIGHT ACHILLES TENDINITIS: ICD-10-CM

## 2025-08-04 DIAGNOSIS — M54.41 CHRONIC BILATERAL LOW BACK PAIN WITH RIGHT-SIDED SCIATICA: ICD-10-CM

## 2025-08-04 DIAGNOSIS — R20.2 NUMBNESS AND TINGLING OF RIGHT LEG: ICD-10-CM

## 2025-08-04 DIAGNOSIS — Z98.890 HISTORY OF RIGHT KNEE SURGERY: ICD-10-CM

## 2025-08-04 DIAGNOSIS — M54.41 ACUTE BILATERAL LOW BACK PAIN WITH RIGHT-SIDED SCIATICA: ICD-10-CM

## 2025-08-04 DIAGNOSIS — M25.661 KNEE STIFFNESS, RIGHT: ICD-10-CM

## 2025-08-04 DIAGNOSIS — G89.29 CHRONIC BILATERAL LOW BACK PAIN WITH RIGHT-SIDED SCIATICA: ICD-10-CM

## 2025-08-04 DIAGNOSIS — Z98.890 HX OF ANTERIOR CRUCIATE LIGAMENT TEAR RECONSTRUCTION: ICD-10-CM

## 2025-08-04 DIAGNOSIS — M21.371 RIGHT FOOT DROP: ICD-10-CM

## 2025-08-04 DIAGNOSIS — G89.29 CHRONIC PAIN OF RIGHT KNEE: ICD-10-CM

## 2025-08-04 DIAGNOSIS — M19.071 PRIMARY OSTEOARTHRITIS OF RIGHT FOOT: Primary | ICD-10-CM

## 2025-08-04 DIAGNOSIS — S86.111A RUPTURE OF RIGHT GASTROCNEMIUS TENDON, INITIAL ENCOUNTER: ICD-10-CM

## 2025-08-04 DIAGNOSIS — M21.70 ACQUIRED LEG LENGTH DISCREPANCY: ICD-10-CM

## 2025-08-04 DIAGNOSIS — R20.0 NUMBNESS AND TINGLING OF RIGHT LEG: ICD-10-CM

## 2025-08-04 DIAGNOSIS — S39.012A LUMBAR STRAIN, INITIAL ENCOUNTER: ICD-10-CM

## 2025-08-04 DIAGNOSIS — M25.561 CHRONIC PAIN OF RIGHT KNEE: ICD-10-CM

## 2025-08-04 DIAGNOSIS — M21.079 VALGUS FOOT DEFORMITY, ACQUIRED, UNSPECIFIED LATERALITY: ICD-10-CM

## 2025-08-04 DIAGNOSIS — S83.8X1A ACUTE MEDIAL MENISCAL INJURY OF RIGHT KNEE, INITIAL ENCOUNTER: ICD-10-CM

## 2025-08-04 DIAGNOSIS — Z98.890 HISTORY OF ANKLE SURGERY: ICD-10-CM

## 2025-08-04 DIAGNOSIS — M25.861 IMPINGEMENT SYNDROME INVOLVING PATELLAR FAT PAD OF RIGHT KNEE: ICD-10-CM

## 2025-08-04 DIAGNOSIS — R29.898 RIGHT LEG WEAKNESS: ICD-10-CM

## 2025-08-04 PROBLEM — M54.50 LOW BACK PAIN: Status: ACTIVE | Noted: 2025-08-04

## 2025-08-04 PROCEDURE — 99213 OFFICE O/P EST LOW 20 MIN: CPT | Mod: 25 | Performed by: FAMILY MEDICINE

## 2025-08-04 PROCEDURE — 96372 THER/PROPH/DIAG INJ SC/IM: CPT | Performed by: FAMILY MEDICINE

## 2025-08-04 PROCEDURE — 72110 X-RAY EXAM L-2 SPINE 4/>VWS: CPT | Performed by: RADIOLOGY

## 2025-08-04 PROCEDURE — 72110 X-RAY EXAM L-2 SPINE 4/>VWS: CPT

## 2025-08-04 PROCEDURE — 2500000004 HC RX 250 GENERAL PHARMACY W/ HCPCS (ALT 636 FOR OP/ED): Mod: JZ | Performed by: FAMILY MEDICINE

## 2025-08-04 RX ORDER — METHYLPREDNISOLONE SODIUM SUCCINATE 125 MG/2ML
125 INJECTION INTRAMUSCULAR; INTRAVENOUS ONCE
Status: COMPLETED | OUTPATIENT
Start: 2025-08-04 | End: 2025-08-04

## 2025-08-04 RX ORDER — PREDNISONE 20 MG/1
TABLET ORAL
Qty: 30 TABLET | Refills: 0 | Status: SHIPPED | OUTPATIENT
Start: 2025-08-04 | End: 2025-08-14

## 2025-08-04 RX ORDER — KETOROLAC TROMETHAMINE 30 MG/ML
30 INJECTION, SOLUTION INTRAMUSCULAR; INTRAVENOUS ONCE
Status: COMPLETED | OUTPATIENT
Start: 2025-08-04 | End: 2025-08-04

## 2025-08-04 RX ORDER — CYCLOBENZAPRINE HCL 10 MG
10 TABLET ORAL DAILY
Qty: 30 TABLET | Refills: 1 | Status: SHIPPED | OUTPATIENT
Start: 2025-08-04

## 2025-08-04 RX ORDER — METHYLPREDNISOLONE ACETATE 40 MG/ML
40 INJECTION, SUSPENSION INTRA-ARTICULAR; INTRALESIONAL; INTRAMUSCULAR; SOFT TISSUE ONCE
Status: COMPLETED | OUTPATIENT
Start: 2025-08-04 | End: 2025-08-04

## 2025-08-04 RX ADMIN — METHYLPREDNISOLONE ACETATE 40 MG: 40 INJECTION, SUSPENSION INTRA-ARTICULAR; INTRALESIONAL; INTRAMUSCULAR; SOFT TISSUE at 12:41

## 2025-08-04 RX ADMIN — METHYLPREDNISOLONE SODIUM SUCCINATE 125 MG: 125 INJECTION, POWDER, FOR SOLUTION INTRAMUSCULAR; INTRAVENOUS at 12:41

## 2025-08-04 RX ADMIN — KETOROLAC TROMETHAMINE 30 MG: 60 INJECTION, SOLUTION INTRAMUSCULAR at 12:40

## 2025-08-04 ASSESSMENT — ENCOUNTER SYMPTOMS
OCCASIONAL FEELINGS OF UNSTEADINESS: 0
DEPRESSION: 0
LOSS OF SENSATION IN FEET: 0

## 2025-08-04 ASSESSMENT — PATIENT HEALTH QUESTIONNAIRE - PHQ9
1. LITTLE INTEREST OR PLEASURE IN DOING THINGS: NOT AT ALL
SUM OF ALL RESPONSES TO PHQ9 QUESTIONS 1 AND 2: 0
2. FEELING DOWN, DEPRESSED OR HOPELESS: NOT AT ALL

## 2025-08-04 ASSESSMENT — COLUMBIA-SUICIDE SEVERITY RATING SCALE - C-SSRS
6. HAVE YOU EVER DONE ANYTHING, STARTED TO DO ANYTHING, OR PREPARED TO DO ANYTHING TO END YOUR LIFE?: NO
1. IN THE PAST MONTH, HAVE YOU WISHED YOU WERE DEAD OR WISHED YOU COULD GO TO SLEEP AND NOT WAKE UP?: NO
2. HAVE YOU ACTUALLY HAD ANY THOUGHTS OF KILLING YOURSELF?: NO

## 2025-08-04 NOTE — PATIENT INSTRUCTIONS
May continue to alternate ice and moist heat as needed  Reviewed handout degenerative joint disease of the knees, patellofemoral tracking syndrome, and/or patellar tendinitis in detail with the patient to the level of their understanding; a copy of this handout was provided to the patient at the time of this office visit.  Start into Physical Therapy 1-2 times a week for 8-10 weeks with manual therapy as well as dry needling and IASTM  Reviewed home exercises to be performed by the patient routinely  Stressed the importance of wearing shoes with good stability control to help with the biomechanics affecting the knees and lower extremities  Stressed the importance of wearing full foot insoles to help with the biomechanics affecting the knees and lower extremities  Recommendation over-the-counter calcium with vitamin-D 2 -3000+ milligrams a day, as well as OTC symphytum as directed daily to promote bony healing, in addition to a daily multivitamin.   Recommendation over-the-counter curcumin, turmeric, boswellia, as well as egg shell membrane as directed to aid with joint inflammation.  Recommendation over-the-counter Move Free for joint health.  TREATMENT PLAN: Patient received IM injection of SoluMedrol 125 milligrams at the time of this office visit. , Patient received IM injection of Toradol 60 milligrams at the time of this office visit., and Patient received IM injection of DepoMedrol 40 milligrams at the time of this office visit.  10 DAY TAPER:  The following prescription was electronically sent to the patient's pharmacy of record: Prednisone 20mg, take 4 tablets a day x 4 days, 3 tabletss a day x 3 days, 2 tablets a day x 2 days, and 1 tablet a day x 1 day with food; start tomorrow if received Solu-Medrol injection in the office at visit, otherwise start immediately, dispense quantity sufficient, with no refills   The following prescription was electronically sent to the patient's pharmacy of record: Flexeril  (Cyclobenzaprine) 10 milligrams, may take 1 tablet 1-2 hours before bedtime as needed for muscle relaxation, Duration: 30 days, Dispense: #30, Refill: 0  Patient advised regarding the risks and/or potential adverse reactions and/or side effects of any prescribed medications along with any over-the-counter medications or any supplements used. Patient advised to seek immediate medical care if any adverse reactions occur. The patient and/or patient(s) parent(s) verbalized their understanding  You have been ordered an MRI of the Right knee, Right ankle, Lumbar spine. Once you contact scheduling at [phone] and obtain the date and time of your MRI, contact our office at [phone] to schedule your follow-up appointment to review your results. Please note the results of your imaging will not be discussed over the telephone.   Discussed in detail with the patient to the level of their understanding the possibility in the future of regenerative injections versus corticosteroid injections versus viscosupplementation injections versus a combination  Follow-up after MRIs

## 2025-08-22 ENCOUNTER — APPOINTMENT (OUTPATIENT)
Dept: RADIOLOGY | Facility: HOSPITAL | Age: 40
End: 2025-08-22
Payer: COMMERCIAL